# Patient Record
Sex: FEMALE | Race: WHITE | Employment: UNEMPLOYED | URBAN - METROPOLITAN AREA
[De-identification: names, ages, dates, MRNs, and addresses within clinical notes are randomized per-mention and may not be internally consistent; named-entity substitution may affect disease eponyms.]

---

## 2018-05-16 ENCOUNTER — APPOINTMENT (OUTPATIENT)
Dept: GENERAL RADIOLOGY | Age: 33
End: 2018-05-16
Payer: COMMERCIAL

## 2018-05-16 ENCOUNTER — HOSPITAL ENCOUNTER (EMERGENCY)
Age: 33
Discharge: HOME | End: 2018-05-17
Attending: EMERGENCY MEDICINE
Payer: COMMERCIAL

## 2018-05-16 VITALS
OXYGEN SATURATION: 98 % | SYSTOLIC BLOOD PRESSURE: 143 MMHG | WEIGHT: 165 LBS | HEIGHT: 62 IN | BODY MASS INDEX: 30.36 KG/M2 | HEART RATE: 88 BPM | RESPIRATION RATE: 20 BRPM | TEMPERATURE: 97.2 F | DIASTOLIC BLOOD PRESSURE: 101 MMHG

## 2018-05-16 DIAGNOSIS — S52.611A CLOSED DISPLACED FRACTURE OF STYLOID PROCESS OF RIGHT ULNA, INITIAL ENCOUNTER: ICD-10-CM
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
## 2018-05-16 DIAGNOSIS — S52.501A CLOSED FRACTURE OF DISTAL END OF RIGHT RADIUS, UNSPECIFIED FRACTURE MORPHOLOGY, INITIAL ENCOUNTER: Primary | ICD-10-CM

## 2018-05-16 PROCEDURE — 25605 CLTX DST RDL FX/EPHYS SEP W/: CPT

## 2018-05-16 PROCEDURE — 73130 X-RAY EXAM OF HAND: CPT

## 2018-05-16 PROCEDURE — 96374 THER/PROPH/DIAG INJ IV PUSH: CPT

## 2018-05-16 PROCEDURE — 73110 X-RAY EXAM OF WRIST: CPT

## 2018-05-16 PROCEDURE — 73080 X-RAY EXAM OF ELBOW: CPT

## 2018-05-16 PROCEDURE — 6360000002 HC RX W HCPCS: Performed by: EMERGENCY MEDICINE

## 2018-05-16 PROCEDURE — 99283 EMERGENCY DEPT VISIT LOW MDM: CPT

## 2018-05-16 RX ORDER — FENTANYL CITRATE 50 UG/ML
100 INJECTION, SOLUTION INTRAMUSCULAR; INTRAVENOUS ONCE
Status: COMPLETED | OUTPATIENT
Start: 2018-05-16 | End: 2018-05-16

## 2018-05-16 RX ADMIN — FENTANYL CITRATE 100 MCG: 50 INJECTION, SOLUTION INTRAMUSCULAR; INTRAVENOUS at 23:41

## 2018-05-16 ASSESSMENT — PAIN DESCRIPTION - LOCATION: LOCATION: WRIST

## 2018-05-16 ASSESSMENT — PAIN SCALES - GENERAL: PAINLEVEL_OUTOF10: 10

## 2018-05-16 ASSESSMENT — PAIN DESCRIPTION - ORIENTATION: ORIENTATION: RIGHT

## 2018-05-16 ASSESSMENT — PAIN DESCRIPTION - PAIN TYPE: TYPE: ACUTE PAIN

## 2018-05-17 ENCOUNTER — APPOINTMENT (OUTPATIENT)
Dept: GENERAL RADIOLOGY | Age: 33
End: 2018-05-17
Payer: COMMERCIAL

## 2018-05-17 PROCEDURE — 96376 TX/PRO/DX INJ SAME DRUG ADON: CPT

## 2018-05-17 PROCEDURE — 6360000002 HC RX W HCPCS: Performed by: EMERGENCY MEDICINE

## 2018-05-17 PROCEDURE — 2500000003 HC RX 250 WO HCPCS: Performed by: STUDENT IN AN ORGANIZED HEALTH CARE EDUCATION/TRAINING PROGRAM

## 2018-05-17 PROCEDURE — 73090 X-RAY EXAM OF FOREARM: CPT

## 2018-05-17 PROCEDURE — 73100 X-RAY EXAM OF WRIST: CPT

## 2018-05-17 PROCEDURE — 73110 X-RAY EXAM OF WRIST: CPT

## 2018-05-17 RX ORDER — LIDOCAINE HYDROCHLORIDE 10 MG/ML
20 INJECTION, SOLUTION INFILTRATION; PERINEURAL ONCE
Status: COMPLETED | OUTPATIENT
Start: 2018-05-17 | End: 2018-05-17

## 2018-05-17 RX ORDER — FENTANYL CITRATE 50 UG/ML
100 INJECTION, SOLUTION INTRAMUSCULAR; INTRAVENOUS ONCE
Status: DISCONTINUED | OUTPATIENT
Start: 2018-05-17 | End: 2018-05-17 | Stop reason: HOSPADM

## 2018-05-17 RX ORDER — HYDROCODONE BITARTRATE AND ACETAMINOPHEN 5; 325 MG/1; MG/1
1 TABLET ORAL EVERY 6 HOURS PRN
Qty: 12 TABLET | Refills: 0 | Status: SHIPPED | OUTPATIENT
Start: 2018-05-17 | End: 2018-05-20

## 2018-05-17 RX ORDER — FENTANYL CITRATE 50 UG/ML
100 INJECTION, SOLUTION INTRAMUSCULAR; INTRAVENOUS ONCE
Status: COMPLETED | OUTPATIENT
Start: 2018-05-17 | End: 2018-05-17

## 2018-05-17 RX ADMIN — FENTANYL CITRATE 100 MCG: 50 INJECTION, SOLUTION INTRAMUSCULAR; INTRAVENOUS at 00:38

## 2018-05-17 RX ADMIN — FENTANYL CITRATE 100 MCG: 50 INJECTION, SOLUTION INTRAMUSCULAR; INTRAVENOUS at 01:44

## 2018-05-17 RX ADMIN — LIDOCAINE HYDROCHLORIDE 20 ML: 10 INJECTION, SOLUTION INFILTRATION; PERINEURAL at 01:51

## 2018-05-17 ASSESSMENT — ENCOUNTER SYMPTOMS
SORE THROAT: 0
RHINORRHEA: 0
EYE REDNESS: 0
DIARRHEA: 0
SHORTNESS OF BREATH: 0
BLOOD IN STOOL: 0
ABDOMINAL PAIN: 0
WHEEZING: 0
ABDOMINAL DISTENTION: 0
STRIDOR: 0
COUGH: 0
CHEST TIGHTNESS: 0
VOMITING: 0
NAUSEA: 0
EYE DISCHARGE: 0

## 2018-05-21 DIAGNOSIS — S69.91XD INJURY OF RIGHT WRIST, SUBSEQUENT ENCOUNTER: Primary | ICD-10-CM

## 2018-05-22 DIAGNOSIS — S62.101A CLOSED FRACTURE OF RIGHT WRIST, INITIAL ENCOUNTER: Primary | ICD-10-CM

## 2018-05-24 ENCOUNTER — OFFICE VISIT (OUTPATIENT)
Dept: ORTHOPEDIC SURGERY | Age: 33
End: 2018-05-24
Payer: COMMERCIAL

## 2018-05-24 VITALS — HEIGHT: 62 IN | WEIGHT: 170 LBS | BODY MASS INDEX: 31.28 KG/M2

## 2018-05-24 DIAGNOSIS — S52.591A OTHER CLOSED FRACTURE OF DISTAL END OF RIGHT RADIUS, INITIAL ENCOUNTER: Primary | ICD-10-CM

## 2018-05-24 PROCEDURE — 25600 CLTX DST RDL FX/EPHYS SEP WO: CPT | Performed by: ORTHOPAEDIC SURGERY

## 2018-05-24 PROCEDURE — 99213 OFFICE O/P EST LOW 20 MIN: CPT | Performed by: ORTHOPAEDIC SURGERY

## 2018-05-24 RX ORDER — IBUPROFEN 800 MG/1
800 TABLET ORAL EVERY 6 HOURS PRN
COMMUNITY
End: 2021-08-08

## 2018-05-24 RX ORDER — OXYCODONE HYDROCHLORIDE AND ACETAMINOPHEN 5; 325 MG/1; MG/1
TABLET ORAL
Refills: 0 | COMMUNITY
Start: 2018-05-18 | End: 2021-08-08

## 2018-05-24 RX ORDER — TRAMADOL HYDROCHLORIDE 50 MG/1
50 TABLET ORAL EVERY 4 HOURS PRN
Qty: 30 TABLET | Refills: 0 | Status: SHIPPED | OUTPATIENT
Start: 2018-05-24 | End: 2018-05-29

## 2018-06-07 ENCOUNTER — OFFICE VISIT (OUTPATIENT)
Dept: ORTHOPEDIC SURGERY | Age: 33
End: 2018-06-07
Payer: COMMERCIAL

## 2018-06-07 VITALS
HEART RATE: 79 BPM | HEIGHT: 62 IN | WEIGHT: 160 LBS | BODY MASS INDEX: 29.44 KG/M2 | DIASTOLIC BLOOD PRESSURE: 79 MMHG | SYSTOLIC BLOOD PRESSURE: 121 MMHG

## 2018-06-07 DIAGNOSIS — S52.591D OTHER CLOSED FRACTURE OF DISTAL END OF RIGHT RADIUS WITH ROUTINE HEALING, SUBSEQUENT ENCOUNTER: ICD-10-CM

## 2018-06-07 PROBLEM — S52.501D CLOSED FRACTURE OF LOWER END OF RIGHT RADIUS WITH ROUTINE HEALING: Status: ACTIVE | Noted: 2018-06-07

## 2018-06-07 PROCEDURE — 99024 POSTOP FOLLOW-UP VISIT: CPT | Performed by: ORTHOPAEDIC SURGERY

## 2018-06-07 PROCEDURE — 29075 APPL CST ELBW FNGR SHORT ARM: CPT | Performed by: ORTHOPAEDIC SURGERY

## 2018-06-07 RX ORDER — ALPRAZOLAM 0.5 MG/1
TABLET ORAL
Refills: 0 | COMMUNITY
Start: 2018-05-29

## 2018-06-29 ENCOUNTER — TELEPHONE (OUTPATIENT)
Dept: ORTHOPEDIC SURGERY | Age: 33
End: 2018-06-29

## 2021-08-08 ENCOUNTER — APPOINTMENT (OUTPATIENT)
Dept: GENERAL RADIOLOGY | Age: 36
End: 2021-08-08
Payer: COMMERCIAL

## 2021-08-08 ENCOUNTER — HOSPITAL ENCOUNTER (EMERGENCY)
Age: 36
Discharge: HOME | End: 2021-08-08
Attending: EMERGENCY MEDICINE
Payer: COMMERCIAL

## 2021-08-08 VITALS
SYSTOLIC BLOOD PRESSURE: 105 MMHG | OXYGEN SATURATION: 100 % | WEIGHT: 135 LBS | RESPIRATION RATE: 16 BRPM | BODY MASS INDEX: 24.84 KG/M2 | DIASTOLIC BLOOD PRESSURE: 77 MMHG | TEMPERATURE: 97.9 F | HEART RATE: 62 BPM | HEIGHT: 62 IN

## 2021-08-08 DIAGNOSIS — L01.03 BULLOUS IMPETIGO: ICD-10-CM

## 2021-08-08 DIAGNOSIS — K02.9 DENTAL CARIES: Primary | ICD-10-CM

## 2021-08-08 DIAGNOSIS — S69.91XA: ICD-10-CM

## 2021-08-08 DIAGNOSIS — L02.91 ABSCESS: ICD-10-CM

## 2021-08-08 PROCEDURE — 6370000000 HC RX 637 (ALT 250 FOR IP): Performed by: STUDENT IN AN ORGANIZED HEALTH CARE EDUCATION/TRAINING PROGRAM

## 2021-08-08 PROCEDURE — 73110 X-RAY EXAM OF WRIST: CPT

## 2021-08-08 PROCEDURE — 99283 EMERGENCY DEPT VISIT LOW MDM: CPT

## 2021-08-08 PROCEDURE — 10061 I&D ABSCESS COMP/MULTIPLE: CPT

## 2021-08-08 RX ORDER — ACETAMINOPHEN 325 MG/1
650 TABLET ORAL ONCE
Status: COMPLETED | OUTPATIENT
Start: 2021-08-08 | End: 2021-08-08

## 2021-08-08 RX ORDER — NAPROXEN 500 MG/1
500 TABLET ORAL 2 TIMES DAILY WITH MEALS
Qty: 90 TABLET | Refills: 1 | Status: ON HOLD | OUTPATIENT
Start: 2021-08-08 | End: 2022-04-04 | Stop reason: HOSPADM

## 2021-08-08 RX ORDER — CEPHALEXIN 500 MG/1
500 CAPSULE ORAL 2 TIMES DAILY
Qty: 14 CAPSULE | Refills: 0 | Status: SHIPPED | OUTPATIENT
Start: 2021-08-08 | End: 2021-08-15

## 2021-08-08 RX ORDER — SULFAMETHOXAZOLE AND TRIMETHOPRIM 800; 160 MG/1; MG/1
1 TABLET ORAL 2 TIMES DAILY
Qty: 14 TABLET | Refills: 0 | Status: SHIPPED | OUTPATIENT
Start: 2021-08-08 | End: 2021-08-15

## 2021-08-08 RX ORDER — ACETAMINOPHEN 500 MG
500 TABLET ORAL 4 TIMES DAILY PRN
Qty: 120 TABLET | Refills: 0 | Status: ON HOLD | OUTPATIENT
Start: 2021-08-08 | End: 2022-04-04 | Stop reason: HOSPADM

## 2021-08-08 RX ADMIN — ACETAMINOPHEN 650 MG: 325 TABLET ORAL at 11:34

## 2021-08-08 ASSESSMENT — ENCOUNTER SYMPTOMS
FACIAL SWELLING: 0
EYE DISCHARGE: 0
ROS SKIN COMMENTS: BLISTERS
TROUBLE SWALLOWING: 0
SORE THROAT: 0
SHORTNESS OF BREATH: 0
ABDOMINAL PAIN: 0

## 2021-08-08 ASSESSMENT — PAIN SCALES - GENERAL
PAINLEVEL_OUTOF10: 8
PAINLEVEL_OUTOF10: 8

## 2021-08-08 ASSESSMENT — PAIN DESCRIPTION - ONSET: ONSET: ON-GOING

## 2021-08-08 ASSESSMENT — PAIN DESCRIPTION - LOCATION: LOCATION: TEETH

## 2021-08-08 ASSESSMENT — PAIN DESCRIPTION - ORIENTATION: ORIENTATION: RIGHT;LEFT;UPPER

## 2021-08-08 ASSESSMENT — PAIN DESCRIPTION - FREQUENCY: FREQUENCY: CONTINUOUS

## 2021-08-08 ASSESSMENT — PAIN DESCRIPTION - PAIN TYPE: TYPE: ACUTE PAIN

## 2021-08-08 ASSESSMENT — PAIN DESCRIPTION - DESCRIPTORS: DESCRIPTORS: ACHING

## 2021-08-08 NOTE — ED PROVIDER NOTES
[facility] ED  Emergency Department Encounter  EmergencyMedicine Resident     Pt Adrian Wright  MRN: [id number]  [place] 1985  Date of evaluation: 8/8/21  PCP:  Champ Lamar MD    This patient was evaluated in the Emergency Department for symptoms described in the history of present illness. The patient was evaluated in the context of the global COVID-19 pandemic, which necessitated consideration that the patient might be at risk for infection with the SARS-CoV-2 virus that causes COVID-19. Institutional protocols and algorithms that pertain to the evaluation of patients at risk for COVID-19 are in a state of rapid change based on information released by regulatory bodies including the CDC and federal and state organizations. These policies and algorithms were followed during the patient's care in the ED. CHIEF COMPLAINT       Chief Complaint   Patient presents with    Dental Pain     pain to top gums and some to lower gums, missing teeth pain for several days    Blister     showing up on hands intermittently     Wrist Injury     4 days ago hit right wrist in car, swelling. HISTORY OF PRESENT ILLNESS  (Location/Symptom, Timing/Onset, Context/Setting, Quality, Duration, Modifying Factors, Severity.)      Yuan Chávez is a 39 y.o. female who presents with acute on chronic dental pain, blistering on her hand, swelling of her right wrist, several swollen/painful masses on bilateral arms. 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 Their plan is to eventually remove her top row of teeth and get a full denture with a partial denture below.  She was told that she has weak enamel and is why she has multiple fractured and infected teeth in the past. Today she is coming in with concerns of a new infection, and has been unable to get appointment with her dentist- they told her the next appointment available is the end of August. She has noticed severe pain in the upper left tooth and tooth root #8 where she previously had a capped tooth that fractured off last summer. She has been taking ibuprofen which upsets her stomach and causes her to vomit. She injured her right wrist four days ago putting on car seat covers and bumped her wrist on the car frame. She noticed severe pain and swelling, which has improved over the past four days with an Ace wrap. She initially had decreased range of motion 2/2 pain, but since removal of ace wrap it has improved. She has history of fracture in that wrist and reports pain is not as significant. Over the past few weeks she has noticed several blisters. A few were noticed on her bilateral fingers and one in her left antecubital fossa. They eventually pop and have been healing well, today she noticed a new one at the base of her middle finger. PAST MEDICAL / SURGICAL / SOCIAL / FAMILY HISTORY      has a past medical history of Anxiety and Depression. has a past surgical history that includes LEEP. Had a previous abscess drained which required a drain be placed and removed at a later date.     Social History     Socioeconomic History    Marital status: Single     Spouse name: Not on file    Number of children: Not on file    Years of education: Not on file    Highest education level: Not on file   Occupational History    Not on file   Tobacco Use    Smoking status: Current Every Day Smoker    Smokeless tobacco: Never Used   Vaping Use    Vaping Use: Never used   Substance and Sexual Activity    Alcohol use: No    Drug use: Yes     Types: Marijuana    Sexual activity: Not on file   Other Topics Concern    Not on file   Social History Narrative    Not on file     Social Determinants of Health     Financial Resource Strain:     Difficulty of Paying Living Expenses:    Food Insecurity:     Worried About Running Out of Food in the Last Year:     [address] in the Last Capillary Refill: Capillary refill takes less than 2 seconds. Neurological:      General: No focal deficit present. Mental Status: She is alert. DIFFERENTIAL  DIAGNOSIS     PLAN (LABS / IMAGING / EKG):  Orders Placed This Encounter   Procedures    XR WRIST RIGHT (MIN 3 VIEWS)    [facility] [facility] Wrist Brace, Right       MEDICATIONS ORDERED:  Orders Placed This Encounter   Medications    acetaminophen (TYLENOL) tablet 650 mg    cephALEXin (KEFLEX) 500 MG capsule     Sig: Take 1 capsule by mouth 2 times daily for 7 days     Dispense:  14 capsule     Refill:  0    sulfamethoxazole-trimethoprim (BACTRIM DS;SEPTRA DS) 800-160 MG per tablet     Sig: Take 1 tablet by mouth 2 times daily for 7 days     Dispense:  14 tablet     Refill:  0    acetaminophen (TYLENOL) 500 MG tablet     Sig: Take 1 tablet by mouth 4 times daily as needed for Pain     Dispense:  120 tablet     Refill:  0    naproxen (NAPROSYN) 500 MG tablet     Sig: Take 1 tablet by mouth 2 times daily (with meals)     Dispense:  90 tablet     Refill:  1       DDX: dental caries, pulpitis  Arm abscess, cellulitis  Wrist fracture vs ligamentous injury    DIAGNOSTIC RESULTS / EMERGENCY DEPARTMENT COURSE / MDM     RADIOLOGY:  1.  Old healed distal radial fracture.       2.  Non healed old ulnar styloid process fracture.       3.  Widened radial lunate space with dorsal subluxation distal ulna   suggesting radioulnar ligamentous disruption with soft tissue swelling.       RECOMMENDATION:   MRI imaging of the wrist may prove helpful which can be performed on a   nonemergent basis. EMERGENCY DEPARTMENT COURSE:  Patient presenting for multiple concerns including dental pain, arm pain, right wrist pain, and hand blisters. She has noticed severe pain in the upper left tooth and tooth root #8 where she previously had a capped tooth that fractured off last summer.  She has been taking ibuprofen which upsets her stomach and causes her to vomit. She injured her right wrist four days ago putting on car seat covers and bumped her wrist on the car frame. She noticed severe pain and swelling, which has improved over the past four days with an Ace wrap. She initially had decreased range of motion 2/2 pain, but since removal of ace wrap it has improved. She has history of fracture in that wrist and reports pain is not as significant. Over the past few weeks she has noticed several blisters. A few were noticed on her bilateral fingers and one in her left antecubital fossa. They eventually pop and have been healing well, today she noticed a new one at the base of her middle finger. Patient denies IV drug use. Dental examination significant for several missing teeth, dark appearance of molar #15, tooth #8 absent/root present and dark appearing. Multiple dental caries noted on upper and lower teeth. Ultrasound examination consistent with abscess on left arm, cellulitis on right arm. Hand blister consistent with bullous impetigo. With decreased ROM in right wrist, concern for fracture so an X-ray was obtained which did not demonstrate fracture but possible ligamentous injury. Dental pain to be treated with antibiotics. Arm abscess incised and drained as below. Arm cellulitis and finger blister to be treated with antibiotics. Wrist injury splinted, instructed to follow up with PCP outpatient for various complaints and MRI. Instructed to follow up with dentist.    PROCEDURES:  PROCEDURE NOTE - INCISION and DRAINAGE    PATIENT NAME: [address] [place] [address] RECORD NO. [id number]  DATE: 8/8/2021  ATTENDING PHYSICIAN: Dr. Cj Bocanegra DIAGNOSIS:  Abscess  POSTOPERATIVE DIAGNOSIS:  Same  PROCEDURE PERFORMED:   Incision and drainage  PERFORMING PHYSICIAN: Erin De La Vega DO      DISCUSSION:  Geovanna Walsh is a 39y.o.-year-old female who requires an incision and drainage of a Abscess. The history and physical examination were reviewed and confirmed. CONSENT: The patient provided verbal consent for this procedure. PROCEDURE:  The patient was positioned appropriately and the skin over the incision site was prepped with chlorhexidine. Local anesthesia was obtained by infiltration using 1% Lidocaine with epinephrine. An incision was then made over the greatest area of fluctuance and approximately 5 cc of purulent material was expressed. Loculations were broken up using a hemostat and more of the material was able to be expressed. The drainage cavity was then irrigated. Covered with gauze. The patient tolerated the procedure well. COMPLICATIONS:  None     Paz Headley DO  12:54 PM, 8/8/21    FINAL IMPRESSION      1. Dental caries    2. Abscess    3. Bullous impetigo    4.  Injury of collateral ligament of right wrist, initial encounter          DISPOSITION / PLAN     DISPOSITION Decision To Discharge 08/08/2021 11:58:45 AM      PATIENT REFERRED TO:  Anna Martínez MD  [address] [place], [place] [address]   [phone]    Schedule an appointment as soon as possible for a visit         DISCHARGE MEDICATIONS:  Discharge Medication List as of 8/8/2021 12:03 PM      START taking these medications    Details   cephALEXin (KEFLEX) 500 MG capsule Take 1 capsule by mouth 2 times daily for 7 days, Disp-14 capsule, R-0Print      sulfamethoxazole-trimethoprim (BACTRIM DS;SEPTRA DS) 800-160 MG per tablet Take 1 tablet by mouth 2 times daily for 7 days, Disp-14 tablet, R-0Print      acetaminophen (TYLENOL) 500 MG tablet Take 1 tablet by mouth 4 times daily as needed for Pain, Disp-120 tablet, R-0Print             Paz Headley DO  Emergency Medicine Resident    (Please note that portions of thisnote were completed with a voice recognition program.  Efforts were made to edit the dictations but occasionally words are mis-transcribed.)       Ravi Victor DO  Resident  08/08/21 1697

## 2022-04-03 ENCOUNTER — APPOINTMENT (OUTPATIENT)
Dept: GENERAL RADIOLOGY | Age: 37
DRG: 313 | End: 2022-04-03
Payer: COMMERCIAL

## 2022-04-03 ENCOUNTER — HOSPITAL ENCOUNTER (EMERGENCY)
Age: 37
Discharge: LEFT BEFORE DISCHARGE | DRG: 313 | End: 2022-04-03
Attending: EMERGENCY MEDICINE
Payer: COMMERCIAL

## 2022-04-03 VITALS
DIASTOLIC BLOOD PRESSURE: 89 MMHG | TEMPERATURE: 99.5 F | HEART RATE: 89 BPM | WEIGHT: 128 LBS | BODY MASS INDEX: 23.55 KG/M2 | RESPIRATION RATE: 16 BRPM | OXYGEN SATURATION: 97 % | HEIGHT: 62 IN | SYSTOLIC BLOOD PRESSURE: 119 MMHG

## 2022-04-03 DIAGNOSIS — S82.301A CLOSED FRACTURE OF DISTAL END OF RIGHT TIBIA, UNSPECIFIED FRACTURE MORPHOLOGY, INITIAL ENCOUNTER: Primary | ICD-10-CM

## 2022-04-03 LAB
ABSOLUTE EOS #: 0.07 K/UL (ref 0–0.44)
ABSOLUTE IMMATURE GRANULOCYTE: 0 K/UL (ref 0–0.3)
ABSOLUTE LYMPH #: 2.35 K/UL (ref 1.1–3.7)
ABSOLUTE MONO #: 0.4 K/UL (ref 0.1–1.2)
ANION GAP SERPL CALCULATED.3IONS-SCNC: 7 MMOL/L (ref 9–17)
BASOPHILS # BLD: 0 % (ref 0–2)
BASOPHILS ABSOLUTE: <0.03 K/UL (ref 0–0.2)
BUN BLDV-MCNC: 9 MG/DL (ref 6–20)
BUN/CREAT BLD: 15 (ref 9–20)
CALCIUM SERPL-MCNC: 9.1 MG/DL (ref 8.6–10.4)
CHLORIDE BLD-SCNC: 102 MMOL/L (ref 98–107)
CO2: 28 MMOL/L (ref 20–31)
CREAT SERPL-MCNC: 0.6 MG/DL (ref 0.5–0.9)
EOSINOPHILS RELATIVE PERCENT: 1 % (ref 1–4)
GFR AFRICAN AMERICAN: >60 ML/MIN
GFR NON-AFRICAN AMERICAN: >60 ML/MIN
GFR SERPL CREATININE-BSD FRML MDRD: ABNORMAL ML/MIN/{1.73_M2}
GLUCOSE BLD-MCNC: 118 MG/DL (ref 70–99)
HCG QUALITATIVE: NEGATIVE
HCT VFR BLD CALC: 38.2 % (ref 36.3–47.1)
HEMOGLOBIN: 11.6 G/DL (ref 11.9–15.1)
IMMATURE GRANULOCYTES: 0 %
INR BLD: 0.9
LYMPHOCYTES # BLD: 40 % (ref 24–43)
MCH RBC QN AUTO: 26.9 PG (ref 25.2–33.5)
MCHC RBC AUTO-ENTMCNC: 30.4 G/DL (ref 28.4–34.8)
MCV RBC AUTO: 88.4 FL (ref 82.6–102.9)
MONOCYTES # BLD: 7 % (ref 3–12)
NRBC AUTOMATED: 0 PER 100 WBC
PARTIAL THROMBOPLASTIN TIME: 31.4 SEC (ref 23.9–33.8)
PDW BLD-RTO: 15.3 % (ref 11.8–14.4)
PLATELET # BLD: 370 K/UL (ref 138–453)
PMV BLD AUTO: 9.5 FL (ref 8.1–13.5)
POTASSIUM SERPL-SCNC: 4.4 MMOL/L (ref 3.7–5.3)
PROTHROMBIN TIME: 12.1 SEC (ref 11.5–14.2)
RBC # BLD: 4.32 M/UL (ref 3.95–5.11)
RBC # BLD: ABNORMAL 10*6/UL
SEG NEUTROPHILS: 52 % (ref 36–65)
SEGMENTED NEUTROPHILS ABSOLUTE COUNT: 2.98 K/UL (ref 1.5–8.1)
SODIUM BLD-SCNC: 137 MMOL/L (ref 135–144)
WBC # BLD: 5.8 K/UL (ref 3.5–11.3)

## 2022-04-03 PROCEDURE — 73610 X-RAY EXAM OF ANKLE: CPT

## 2022-04-03 PROCEDURE — 2580000003 HC RX 258: Performed by: NURSE PRACTITIONER

## 2022-04-03 PROCEDURE — 96376 TX/PRO/DX INJ SAME DRUG ADON: CPT

## 2022-04-03 PROCEDURE — 85025 COMPLETE CBC W/AUTO DIFF WBC: CPT

## 2022-04-03 PROCEDURE — 73590 X-RAY EXAM OF LOWER LEG: CPT

## 2022-04-03 PROCEDURE — 84703 CHORIONIC GONADOTROPIN ASSAY: CPT

## 2022-04-03 PROCEDURE — 99284 EMERGENCY DEPT VISIT MOD MDM: CPT

## 2022-04-03 PROCEDURE — 96375 TX/PRO/DX INJ NEW DRUG ADDON: CPT

## 2022-04-03 PROCEDURE — 29505 APPLICATION LONG LEG SPLINT: CPT

## 2022-04-03 PROCEDURE — 85610 PROTHROMBIN TIME: CPT

## 2022-04-03 PROCEDURE — 85730 THROMBOPLASTIN TIME PARTIAL: CPT

## 2022-04-03 PROCEDURE — 6360000002 HC RX W HCPCS: Performed by: NURSE PRACTITIONER

## 2022-04-03 PROCEDURE — 96374 THER/PROPH/DIAG INJ IV PUSH: CPT

## 2022-04-03 PROCEDURE — 80048 BASIC METABOLIC PNL TOTAL CA: CPT

## 2022-04-03 RX ORDER — SODIUM CHLORIDE 9 MG/ML
INJECTION, SOLUTION INTRAVENOUS CONTINUOUS
Status: DISCONTINUED | OUTPATIENT
Start: 2022-04-03 | End: 2022-04-03 | Stop reason: HOSPADM

## 2022-04-03 RX ORDER — MORPHINE SULFATE 4 MG/ML
4 INJECTION, SOLUTION INTRAMUSCULAR; INTRAVENOUS ONCE
Status: COMPLETED | OUTPATIENT
Start: 2022-04-03 | End: 2022-04-03

## 2022-04-03 RX ORDER — OXYCODONE HYDROCHLORIDE AND ACETAMINOPHEN 5; 325 MG/1; MG/1
1 TABLET ORAL EVERY 6 HOURS PRN
Qty: 10 TABLET | Refills: 0 | Status: ON HOLD | OUTPATIENT
Start: 2022-04-03 | End: 2022-04-04 | Stop reason: HOSPADM

## 2022-04-03 RX ORDER — 0.9 % SODIUM CHLORIDE 0.9 %
1000 INTRAVENOUS SOLUTION INTRAVENOUS ONCE
Status: COMPLETED | OUTPATIENT
Start: 2022-04-03 | End: 2022-04-03

## 2022-04-03 RX ORDER — ONDANSETRON 2 MG/ML
4 INJECTION INTRAMUSCULAR; INTRAVENOUS ONCE
Status: COMPLETED | OUTPATIENT
Start: 2022-04-03 | End: 2022-04-03

## 2022-04-03 RX ADMIN — MORPHINE SULFATE 4 MG: 4 INJECTION, SOLUTION INTRAMUSCULAR; INTRAVENOUS at 18:43

## 2022-04-03 RX ADMIN — MORPHINE SULFATE 4 MG: 4 INJECTION, SOLUTION INTRAMUSCULAR; INTRAVENOUS at 17:08

## 2022-04-03 RX ADMIN — SODIUM CHLORIDE 1000 ML: 9 INJECTION, SOLUTION INTRAVENOUS at 17:11

## 2022-04-03 RX ADMIN — ONDANSETRON 4 MG: 2 INJECTION INTRAMUSCULAR; INTRAVENOUS at 17:09

## 2022-04-03 ASSESSMENT — ENCOUNTER SYMPTOMS: COLOR CHANGE: 1

## 2022-04-03 ASSESSMENT — PAIN SCALES - GENERAL
PAINLEVEL_OUTOF10: 10
PAINLEVEL_OUTOF10: 8

## 2022-04-03 NOTE — ED PROVIDER NOTES
This visit was performed by both a physician and an APC. I personally evaluated and examined the patient. I performed all aspects of the MDM as documented. She has displaced tibial fracture.   Findings were discussed with the patient and she adamantly refuses to stay in the hospital.     Marlin Saravia MD  04/03/22 [id number]

## 2022-04-03 NOTE — ED PROVIDER NOTES
[address] [place] [address] ED  EMERGENCY DEPARTMENT ENCOUNTER      Pt Name: Jessica Pradhan  MRN: [id number]  [place] 1985  Date of evaluation: 4/3/2022  Provider: SWEETIE Sullivan CNP    CHIEF COMPLAINT       Chief Complaint   Patient presents with    Leg Pain     right leg in shower. 1400; no meds pta         HISTORY OFPRESENT ILLNESS  (Location/Symptom, Timing/Onset, Context/Setting, Quality, Duration, Modifying Factors, Severity.)   Jessica Pradhan is a 39 y.o. female who presents to the emergency department by private auto for evaluation of right leg pain after she fell getting out of the shower around 2 PM today. Patient states she slipped in her organoid off on her feet causing her to fall. She is unsure if she may have struck her right leg on the toilet or bathtub. States she got up and lay down in the bed. The pain was so significant she cannot sleep. She is unable to bear weight on the leg after that. Called EMS. Pain is 10 out of 10. Denies other injury. Did Not hit her head. Nursing Notes were reviewed.     PASTMEDICAL HISTORY     Past Medical History:   Diagnosis Date    Anxiety     Depression          SURGICAL HISTORY       Past Surgical History:   Procedure Laterality Date    LEEP           CURRENT MEDICATIONS     Discharge Medication List as of 4/3/2022  6:58 PM      CONTINUE these medications which have NOT CHANGED    Details   acetaminophen (TYLENOL) 500 MG tablet Take 1 tablet by mouth 4 times daily as needed for Pain, Disp-120 tablet, R-0Print      naproxen (NAPROSYN) 500 MG tablet Take 1 tablet by mouth 2 times daily (with meals), Disp-90 tablet, R-1Print      ALPRAZolam (XANAX) 0.5 MG tablet TAKE 1 TABLET BY MOUTH TWICE A DAY, R-0Historical Med             ALLERGIES     No known allergies    FAMILY HISTORY       Family History   Problem Relation Age of Onset    Hypertension Mother     Stroke Mother     Thyroid Disease Mother     Diabetes Father     Heart Disease Father SOCIAL HISTORY       Social History     Socioeconomic History    Marital status: Single     Spouse name: None    Number of children: None    Years of education: None    Highest education level: None   Occupational History    None   Tobacco Use    Smoking status: Current Every Day Smoker    Smokeless tobacco: Never Used   Vaping Use    Vaping Use: Never used   Substance and Sexual Activity    Alcohol use: No    Drug use: Yes     Types: Marijuana [place])    Sexual activity: None   Other Topics Concern    None   Social History Narrative    None     Social Determinants of Health     Financial Resource Strain:     Difficulty of Paying Living Expenses: Not on file   Food Insecurity:     Worried About Running Out of Food in the Last Year: Not on file    Luke of Food in the Last Year: Not on file   Transportation Needs:     Lack of Transportation (Medical): Not on file    Lack of Transportation (Non-Medical):  Not on file   Physical Activity:     Days of Exercise per Week: Not on file    Minutes of Exercise per Session: Not on file   Stress:     Feeling of Stress : Not on file   Social Connections:     Frequency of Communication with Friends and Family: Not on file    Frequency of Social Gatherings with Friends and Family: Not on file    Attends Spiritism Services: Not on file    Active Member of [address] [place] [address] or Organizations: Not on file    Attends Club or Organization Meetings: Not on file    Marital Status: Not on file   Intimate Partner Violence:     Fear of Current or Ex-Partner: Not on file    Emotionally Abused: Not on file    Physically Abused: Not on file    Sexually Abused: Not on file   Housing Stability:     Unable to Pay for Housing in the Last Year: Not on file    Number of [place] in the Last Year: Not on file    Unstable Housing in the Last Year: Not on file         REVIEW OF SYSTEMS    (2-9 systems for level 4, 10 or more for level 5)     Review of Systems Constitutional: Positive for activity change. Musculoskeletal: Positive for arthralgias and gait problem. Skin: Positive for color change. Negative for wound. All other systems reviewed and are negative. Except as noted above the remainder of the review of systems was reviewed and negative. PHYSICAL EXAM    (up to 7 for level 4, 8 or more for level 5)     ED Triage Vitals [04/03/22 1632]   BP Temp Temp Source Pulse Resp SpO2 Height Weight   119/89 99.5 °F (37.5 °C) Oral 89 16 97 % 5' 2\" (1.575 m) 128 lb (58.1 kg)       Physical Exam  Constitutional:       General: She is in acute distress. Appearance: Normal appearance. She is well-developed, well-groomed and normal weight. HENT:      Head: Normocephalic. Right Ear: External ear normal.      Left Ear: External ear normal.      Nose: Nose normal.      Mouth/Throat:      Mouth: Mucous membranes are moist.   Eyes:      Extraocular Movements: Extraocular movements intact. Conjunctiva/sclera: Conjunctivae normal.      Pupils: Pupils are equal, round, and reactive to light. Cardiovascular:      Pulses:           Dorsalis pedis pulses are 2+ on the right side. Posterior tibial pulses are 2+ on the right side. Pulmonary:      Effort: Pulmonary effort is normal. No respiratory distress. Musculoskeletal:      Right lower leg: Swelling and tenderness present. Right ankle: Swelling present. Tenderness present over the lateral malleolus and medial malleolus. Decreased range of motion. Right foot: Normal.        Legs:       Comments: There is tenderness, ecchymosis, and swelling to the mid anterior portion of the right lower extremity. Patient says swelling and tenderness to the lateral and medial malleolus of the right ankle. Difficulty flexing her ankle. Pedal pulses palpable. Skin:     General: Skin is warm and dry. Capillary Refill: Capillary refill takes less than 2 seconds. Findings: Bruising present.  No Result   Tibial fracture. EDBEDSIDE ULTRASOUND:   Performed by Loreto Morales - none    LABS:  Labs Reviewed   CBC WITH AUTO DIFFERENTIAL - Abnormal; Notable for the following components:       Result Value    Hemoglobin 11.6 (*)     RDW 15.3 (*)     All other components within normal limits   BASIC METABOLIC PANEL - Abnormal; Notable for the following components:    Glucose 118 (*)     Anion Gap 7 (*)     All other components within normal limits   HCG, SERUM, QUALITATIVE   APTT   PROTIME-INR       All other labs were within normal range or not returned as of this dictation. EMERGENCY DEPARTMENT COURSE andDIFFERENTIAL DIAGNOSIS/MDM:   Patient evaluated in conjunction attending physician. X-ray shows displaced oblique fracture of the distal tibia without significant angulation. Component of the fracture extends distally to involve the medial malleolus with intra-articular extension soft tissue swelling is noted. Pedal pulses palpable. Right lower extremity is soft to palpation. Discussed x-ray results with patient and informed her she will be admitted for further evaluation. At first the patient agreed to be admitted. Orthopedic consult-I spoke with Dr. Ness Rios. The plan was to the patient overnight to have surgery in the morning. I went back to discuss the plan with the patient after she initially was agreeable to being admitted to hospital but then the patient states she did not want to stay overnight and she will come back tomorrow. I discussed the risks of patient going home. Risks of compartment syndrome and worsening of her symptoms. Patient states she still wants to leave. Patient is alert and oriented x3. She is competent in her medical decisions. Informed patient that she will need to sign out AMA which she did. I spoke with Dr. Ness Rios again in regards to patient not wanting to stay.   Dr. Ness Rios stated patient can come back tomorrow at 1 PM for surgery at 3 PM.  I discussed this with the patient and she is agreeable. Informed her that she will need to not eat or drink anything after midnight. Do not take any blood thinning medication. A long leg posterior splint was placed to the right lower extremity by the nurse. I checked application of splint found to be in appropriate position. Patient is neurovascularly intact post splint application. She was provided with crutches. Patient is to come back to the hospital tomorrow at 1 PM for surgery at 3 PM.  Strict return precautions were provided to the patient, should her symptoms worsen through the night. A prescription was written for Percocet. Vitals:    Vitals:    04/03/22 1632   BP: 119/89   Pulse: 89   Resp: 16   Temp: 99.5 °F (37.5 °C)   TempSrc: Oral   SpO2: 97%   Weight: 128 lb (58.1 kg)   Height: 5' 2\" (1.575 m)         CONSULTS:  IP CONSULT TO ORTHOPEDIC SURGERY  IP CONSULT TO INTERNAL MEDICINE    RES:  Procedures    FINAL IMPRESSION      1. Closed fracture of distal end of right tibia, unspecified fracture morphology, initial encounter          DISPOSITION/PLAN   DISPOSITION [place] 04/03/2022 06:53:47 PM      PATIENT REFERRED TO:     Come back to the hospital at 1 PM tomorrow. [facility] ED  [address] [facility]  [phone]    If symptoms worsen      DISCHARGE MEDICATIONS:     Discharge Medication List as of 4/3/2022  6:58 PM      START taking these medications    Details   oxyCODONE-acetaminophen (PERCOCET) 5-325 MG per tablet Take 1 tablet by mouth every 6 hours as needed for Pain for up to 3 days. Intended supply: 3 days.  Take lowest dose possible to manage pain, Disp-10 tablet, R-0Print           Electronically signed by SWEETIE Paige 4/4/2022 at [address] SWEETIE [address] CNP  04/04/22 2024

## 2022-04-03 NOTE — Clinical Note
The patient has decided to leave against medical advice, because she states she needs to go home and take care of her animals. She has normal mental status and adequate capacity to make medical decisions. The patient refuses hospital admission  and wants to be discharged. The risks have been explained to the patient, including worsening illness, chronic pain, permanent disability, and death. Symptoms of compartment syndrome discussed with the patient. The benefits of admission have a lso been explained, including the availability and proximity of nurses, physicians, monitoring, diagnostic testing, and treatment. The patient was able to understand and state the risks and benefits of hospital admission. This was witnessed by me . She had the opportunity to ask questions about her medical condition. The patient was treated to the extent that she would allow, and knows that she may return for care at any time. Follow up has been discussed and arranged with Dr. Karla Casillas. Patient is to come back to the hospital at 1 PM tomorrow to have surgery on her leg at 3 PM.  This was discussed with the patient and she agrees.

## 2022-04-04 ENCOUNTER — HOSPITAL ENCOUNTER (INPATIENT)
Age: 37
LOS: 1 days | Discharge: HOME | DRG: 313 | End: 2022-04-05
Attending: ORTHOPAEDIC SURGERY | Admitting: ORTHOPAEDIC SURGERY
Payer: COMMERCIAL

## 2022-04-04 ENCOUNTER — APPOINTMENT (OUTPATIENT)
Dept: GENERAL RADIOLOGY | Age: 37
DRG: 313 | End: 2022-04-04
Attending: ORTHOPAEDIC SURGERY
Payer: COMMERCIAL

## 2022-04-04 ENCOUNTER — ANESTHESIA (OUTPATIENT)
Dept: OPERATING ROOM | Age: 37
DRG: 313 | End: 2022-04-04
Payer: COMMERCIAL

## 2022-04-04 ENCOUNTER — ANESTHESIA EVENT (OUTPATIENT)
Dept: OPERATING ROOM | Age: 37
DRG: 313 | End: 2022-04-04
Payer: COMMERCIAL

## 2022-04-04 VITALS — DIASTOLIC BLOOD PRESSURE: 68 MMHG | OXYGEN SATURATION: 97 % | SYSTOLIC BLOOD PRESSURE: 137 MMHG | TEMPERATURE: 99.3 F

## 2022-04-04 DIAGNOSIS — G89.18 POST-OPERATIVE PAIN: Primary | ICD-10-CM

## 2022-04-04 PROBLEM — S82.241A CLOSED DISPLACED SPIRAL FRACTURE OF SHAFT OF RIGHT TIBIA: Status: ACTIVE | Noted: 2022-04-04

## 2022-04-04 LAB
HCT VFR BLD CALC: 37 % (ref 36.3–47.1)
HEMOGLOBIN: 10.9 G/DL (ref 11.9–15.1)
SARS-COV-2, RAPID: NOT DETECTED
SPECIMEN DESCRIPTION: NORMAL

## 2022-04-04 PROCEDURE — 6360000002 HC RX W HCPCS: Performed by: ANESTHESIOLOGY

## 2022-04-04 PROCEDURE — 2500000003 HC RX 250 WO HCPCS: Performed by: NURSE ANESTHETIST, CERTIFIED REGISTERED

## 2022-04-04 PROCEDURE — 7100000001 HC PACU RECOVERY - ADDTL 15 MIN: Performed by: ORTHOPAEDIC SURGERY

## 2022-04-04 PROCEDURE — 99223 1ST HOSP IP/OBS HIGH 75: CPT | Performed by: ORTHOPAEDIC SURGERY

## 2022-04-04 PROCEDURE — C1769 GUIDE WIRE: HCPCS | Performed by: ORTHOPAEDIC SURGERY

## 2022-04-04 PROCEDURE — 27759 TREATMENT OF TIBIA FRACTURE: CPT | Performed by: ORTHOPAEDIC SURGERY

## 2022-04-04 PROCEDURE — 2580000003 HC RX 258: Performed by: NURSE ANESTHETIST, CERTIFIED REGISTERED

## 2022-04-04 PROCEDURE — 64447 NJX AA&/STRD FEMORAL NRV IMG: CPT | Performed by: ANESTHESIOLOGY

## 2022-04-04 PROCEDURE — 85014 HEMATOCRIT: CPT

## 2022-04-04 PROCEDURE — 3700000001 HC ADD 15 MINUTES (ANESTHESIA): Performed by: ORTHOPAEDIC SURGERY

## 2022-04-04 PROCEDURE — 3700000000 HC ANESTHESIA ATTENDED CARE: Performed by: ORTHOPAEDIC SURGERY

## 2022-04-04 PROCEDURE — 73590 X-RAY EXAM OF LOWER LEG: CPT

## 2022-04-04 PROCEDURE — C1713 ANCHOR/SCREW BN/BN,TIS/BN: HCPCS | Performed by: ORTHOPAEDIC SURGERY

## 2022-04-04 PROCEDURE — 2720000010 HC SURG SUPPLY STERILE: Performed by: ORTHOPAEDIC SURGERY

## 2022-04-04 PROCEDURE — 27899 UNLISTED PX LEG/ANKLE: CPT | Performed by: ORTHOPAEDIC SURGERY

## 2022-04-04 PROCEDURE — 0QSG06Z REPOSITION RIGHT TIBIA WITH INTRAMEDULLARY INTERNAL FIXATION DEVICE, OPEN APPROACH: ICD-10-PCS | Performed by: ORTHOPAEDIC SURGERY

## 2022-04-04 PROCEDURE — 6370000000 HC RX 637 (ALT 250 FOR IP): Performed by: ORTHOPAEDIC SURGERY

## 2022-04-04 PROCEDURE — 3600000003 HC SURGERY LEVEL 3 BASE: Performed by: ORTHOPAEDIC SURGERY

## 2022-04-04 PROCEDURE — 36415 COLL VENOUS BLD VENIPUNCTURE: CPT

## 2022-04-04 PROCEDURE — G0378 HOSPITAL OBSERVATION PER HR: HCPCS

## 2022-04-04 PROCEDURE — 85018 HEMOGLOBIN: CPT

## 2022-04-04 PROCEDURE — 87635 SARS-COV-2 COVID-19 AMP PRB: CPT

## 2022-04-04 PROCEDURE — 6370000000 HC RX 637 (ALT 250 FOR IP): Performed by: ANESTHESIOLOGY

## 2022-04-04 PROCEDURE — 73610 X-RAY EXAM OF ANKLE: CPT

## 2022-04-04 PROCEDURE — 3209999900 FLUORO FOR SURGICAL PROCEDURES

## 2022-04-04 PROCEDURE — 6360000002 HC RX W HCPCS: Performed by: ORTHOPAEDIC SURGERY

## 2022-04-04 PROCEDURE — 2500000003 HC RX 250 WO HCPCS: Performed by: ANESTHESIOLOGY

## 2022-04-04 PROCEDURE — 7100000000 HC PACU RECOVERY - FIRST 15 MIN: Performed by: ORTHOPAEDIC SURGERY

## 2022-04-04 PROCEDURE — 1200000000 HC SEMI PRIVATE

## 2022-04-04 PROCEDURE — 6360000002 HC RX W HCPCS: Performed by: NURSE ANESTHETIST, CERTIFIED REGISTERED

## 2022-04-04 PROCEDURE — 6370000000 HC RX 637 (ALT 250 FOR IP): Performed by: STUDENT IN AN ORGANIZED HEALTH CARE EDUCATION/TRAINING PROGRAM

## 2022-04-04 PROCEDURE — 2709999900 HC NON-CHARGEABLE SUPPLY: Performed by: ORTHOPAEDIC SURGERY

## 2022-04-04 PROCEDURE — 3600000013 HC SURGERY LEVEL 3 ADDTL 15MIN: Performed by: ORTHOPAEDIC SURGERY

## 2022-04-04 DEVICE — SCREW BNE L30MM DIA4MM TIB BLU TI ST CANN LOK FULL THRD T25: Type: IMPLANTABLE DEVICE | Site: TIBIA | Status: FUNCTIONAL

## 2022-04-04 DEVICE — IMPLANTABLE DEVICE: Type: IMPLANTABLE DEVICE | Site: TIBIA | Status: FUNCTIONAL

## 2022-04-04 DEVICE — SCREW BNE L36MM DIA4MM TIB BLU TI ST CANN LOK FULL THRD T25: Type: IMPLANTABLE DEVICE | Site: TIBIA | Status: FUNCTIONAL

## 2022-04-04 DEVICE — SCREW BNE L32MM DIA4MM TIB BLU TI ST CANN LOK FULL THRD T25: Type: IMPLANTABLE DEVICE | Site: TIBIA | Status: FUNCTIONAL

## 2022-04-04 DEVICE — SCREW BNE L34MM DIA4MM NONSTERILE TIB BLU TI ST CANN LOK: Type: IMPLANTABLE DEVICE | Status: FUNCTIONAL

## 2022-04-04 RX ORDER — NEOSTIGMINE METHYLSULFATE 5 MG/5 ML
SYRINGE (ML) INTRAVENOUS PRN
Status: DISCONTINUED | OUTPATIENT
Start: 2022-04-04 | End: 2022-04-04 | Stop reason: SDUPTHER

## 2022-04-04 RX ORDER — ACETAMINOPHEN 500 MG
1000 TABLET ORAL EVERY 6 HOURS
Status: DISCONTINUED | OUTPATIENT
Start: 2022-04-04 | End: 2022-04-05 | Stop reason: HOSPADM

## 2022-04-04 RX ORDER — ROCURONIUM BROMIDE 10 MG/ML
INJECTION, SOLUTION INTRAVENOUS PRN
Status: DISCONTINUED | OUTPATIENT
Start: 2022-04-04 | End: 2022-04-04 | Stop reason: SDUPTHER

## 2022-04-04 RX ORDER — ONDANSETRON 2 MG/ML
4 INJECTION INTRAMUSCULAR; INTRAVENOUS EVERY 6 HOURS PRN
Status: DISCONTINUED | OUTPATIENT
Start: 2022-04-04 | End: 2022-04-05 | Stop reason: HOSPADM

## 2022-04-04 RX ORDER — FENTANYL CITRATE 50 UG/ML
25 INJECTION, SOLUTION INTRAMUSCULAR; INTRAVENOUS EVERY 5 MIN PRN
Status: DISCONTINUED | OUTPATIENT
Start: 2022-04-04 | End: 2022-04-04 | Stop reason: HOSPADM

## 2022-04-04 RX ORDER — PHENYLEPHRINE HCL IN 0.9% NACL 1 MG/10 ML
SYRINGE (ML) INTRAVENOUS PRN
Status: DISCONTINUED | OUTPATIENT
Start: 2022-04-04 | End: 2022-04-04 | Stop reason: SDUPTHER

## 2022-04-04 RX ORDER — SODIUM CHLORIDE 9 MG/ML
INJECTION, SOLUTION INTRAVENOUS PRN
Status: DISCONTINUED | OUTPATIENT
Start: 2022-04-04 | End: 2022-04-04 | Stop reason: HOSPADM

## 2022-04-04 RX ORDER — SODIUM CHLORIDE 0.9 % (FLUSH) 0.9 %
5-40 SYRINGE (ML) INJECTION EVERY 12 HOURS SCHEDULED
Status: DISCONTINUED | OUTPATIENT
Start: 2022-04-04 | End: 2022-04-04 | Stop reason: HOSPADM

## 2022-04-04 RX ORDER — OXYCODONE HYDROCHLORIDE 5 MG/1
5 TABLET ORAL EVERY 4 HOURS PRN
Status: DISCONTINUED | OUTPATIENT
Start: 2022-04-04 | End: 2022-04-05 | Stop reason: HOSPADM

## 2022-04-04 RX ORDER — SODIUM CHLORIDE, SODIUM LACTATE, POTASSIUM CHLORIDE, CALCIUM CHLORIDE 600; 310; 30; 20 MG/100ML; MG/100ML; MG/100ML; MG/100ML
INJECTION, SOLUTION INTRAVENOUS CONTINUOUS PRN
Status: DISCONTINUED | OUTPATIENT
Start: 2022-04-04 | End: 2022-04-04 | Stop reason: SDUPTHER

## 2022-04-04 RX ORDER — ONDANSETRON 4 MG/1
4 TABLET, ORALLY DISINTEGRATING ORAL EVERY 8 HOURS PRN
Status: DISCONTINUED | OUTPATIENT
Start: 2022-04-04 | End: 2022-04-05 | Stop reason: HOSPADM

## 2022-04-04 RX ORDER — SODIUM CHLORIDE 0.9 % (FLUSH) 0.9 %
5-40 SYRINGE (ML) INJECTION PRN
Status: DISCONTINUED | OUTPATIENT
Start: 2022-04-04 | End: 2022-04-04 | Stop reason: HOSPADM

## 2022-04-04 RX ORDER — SODIUM CHLORIDE 0.9 % (FLUSH) 0.9 %
5-40 SYRINGE (ML) INJECTION EVERY 12 HOURS SCHEDULED
Status: DISCONTINUED | OUTPATIENT
Start: 2022-04-04 | End: 2022-04-05 | Stop reason: HOSPADM

## 2022-04-04 RX ORDER — GABAPENTIN 100 MG/1
100 CAPSULE ORAL 3 TIMES DAILY
Qty: 42 CAPSULE | Refills: 0 | Status: SHIPPED | OUTPATIENT
Start: 2022-04-04 | End: 2022-04-22 | Stop reason: SDUPTHER

## 2022-04-04 RX ORDER — OXYCODONE HYDROCHLORIDE 5 MG/1
10 TABLET ORAL EVERY 4 HOURS PRN
Status: DISCONTINUED | OUTPATIENT
Start: 2022-04-04 | End: 2022-04-05 | Stop reason: HOSPADM

## 2022-04-04 RX ORDER — EPHEDRINE SULFATE/0.9% NACL/PF 50 MG/5 ML
SYRINGE (ML) INTRAVENOUS PRN
Status: DISCONTINUED | OUTPATIENT
Start: 2022-04-04 | End: 2022-04-04 | Stop reason: SDUPTHER

## 2022-04-04 RX ORDER — HYDROMORPHONE HYDROCHLORIDE 1 MG/ML
0.25 INJECTION, SOLUTION INTRAMUSCULAR; INTRAVENOUS; SUBCUTANEOUS EVERY 5 MIN PRN
Status: DISCONTINUED | OUTPATIENT
Start: 2022-04-04 | End: 2022-04-04 | Stop reason: HOSPADM

## 2022-04-04 RX ORDER — ONDANSETRON 2 MG/ML
4 INJECTION INTRAMUSCULAR; INTRAVENOUS
Status: DISCONTINUED | OUTPATIENT
Start: 2022-04-04 | End: 2022-04-04 | Stop reason: HOSPADM

## 2022-04-04 RX ORDER — GLYCOPYRROLATE 1 MG/5 ML
SYRINGE (ML) INTRAVENOUS PRN
Status: DISCONTINUED | OUTPATIENT
Start: 2022-04-04 | End: 2022-04-04 | Stop reason: SDUPTHER

## 2022-04-04 RX ORDER — FENTANYL CITRATE 50 UG/ML
INJECTION, SOLUTION INTRAMUSCULAR; INTRAVENOUS PRN
Status: DISCONTINUED | OUTPATIENT
Start: 2022-04-04 | End: 2022-04-04 | Stop reason: SDUPTHER

## 2022-04-04 RX ORDER — MIDAZOLAM HYDROCHLORIDE 1 MG/ML
2 INJECTION INTRAMUSCULAR; INTRAVENOUS ONCE
Status: COMPLETED | OUTPATIENT
Start: 2022-04-04 | End: 2022-04-04

## 2022-04-04 RX ORDER — OXYCODONE HYDROCHLORIDE 5 MG/1
5-10 TABLET ORAL EVERY 6 HOURS PRN
Qty: 30 TABLET | Refills: 0 | Status: SHIPPED | OUTPATIENT
Start: 2022-04-04 | End: 2022-04-09

## 2022-04-04 RX ORDER — SCOLOPAMINE TRANSDERMAL SYSTEM 1 MG/1
1 PATCH, EXTENDED RELEASE TRANSDERMAL ONCE
Status: DISCONTINUED | OUTPATIENT
Start: 2022-04-04 | End: 2022-04-04

## 2022-04-04 RX ORDER — ALPRAZOLAM 0.5 MG/1
0.5 TABLET ORAL 2 TIMES DAILY
Status: DISCONTINUED | OUTPATIENT
Start: 2022-04-04 | End: 2022-04-05 | Stop reason: HOSPADM

## 2022-04-04 RX ORDER — SODIUM CHLORIDE, SODIUM LACTATE, POTASSIUM CHLORIDE, CALCIUM CHLORIDE 600; 310; 30; 20 MG/100ML; MG/100ML; MG/100ML; MG/100ML
INJECTION, SOLUTION INTRAVENOUS CONTINUOUS
Status: DISCONTINUED | OUTPATIENT
Start: 2022-04-04 | End: 2022-04-04

## 2022-04-04 RX ORDER — LIDOCAINE HYDROCHLORIDE 20 MG/ML
INJECTION, SOLUTION EPIDURAL; INFILTRATION; INTRACAUDAL; PERINEURAL PRN
Status: DISCONTINUED | OUTPATIENT
Start: 2022-04-04 | End: 2022-04-04 | Stop reason: SDUPTHER

## 2022-04-04 RX ORDER — IBUPROFEN 800 MG/1
800 TABLET ORAL EVERY 6 HOURS PRN
Status: ON HOLD | COMMUNITY
End: 2022-04-04 | Stop reason: HOSPADM

## 2022-04-04 RX ORDER — SODIUM CHLORIDE 9 MG/ML
INJECTION, SOLUTION INTRAVENOUS PRN
Status: DISCONTINUED | OUTPATIENT
Start: 2022-04-04 | End: 2022-04-05 | Stop reason: HOSPADM

## 2022-04-04 RX ORDER — DIPHENHYDRAMINE HYDROCHLORIDE 50 MG/ML
12.5 INJECTION INTRAMUSCULAR; INTRAVENOUS ONCE
Status: COMPLETED | OUTPATIENT
Start: 2022-04-04 | End: 2022-04-04

## 2022-04-04 RX ORDER — SODIUM CHLORIDE 0.9 % (FLUSH) 0.9 %
5-40 SYRINGE (ML) INJECTION PRN
Status: DISCONTINUED | OUTPATIENT
Start: 2022-04-04 | End: 2022-04-05 | Stop reason: HOSPADM

## 2022-04-04 RX ORDER — ACETAMINOPHEN 500 MG
1000 TABLET ORAL EVERY 6 HOURS PRN
Qty: 112 TABLET | Refills: 0 | Status: SHIPPED | OUTPATIENT
Start: 2022-04-04

## 2022-04-04 RX ORDER — BUPIVACAINE HYDROCHLORIDE 5 MG/ML
INJECTION, SOLUTION EPIDURAL; INTRACAUDAL
Status: COMPLETED | OUTPATIENT
Start: 2022-04-04 | End: 2022-04-04

## 2022-04-04 RX ORDER — NAPROXEN 500 MG/1
500 TABLET ORAL EVERY 12 HOURS PRN
Qty: 28 TABLET | Refills: 0 | Status: SHIPPED | OUTPATIENT
Start: 2022-04-04

## 2022-04-04 RX ORDER — CYCLOBENZAPRINE HCL 10 MG
10 TABLET ORAL EVERY 6 HOURS PRN
Qty: 50 TABLET | Refills: 0 | Status: SHIPPED | OUTPATIENT
Start: 2022-04-04 | End: 2022-04-14

## 2022-04-04 RX ORDER — CEFAZOLIN SODIUM 1 G/3ML
INJECTION, POWDER, FOR SOLUTION INTRAMUSCULAR; INTRAVENOUS PRN
Status: DISCONTINUED | OUTPATIENT
Start: 2022-04-04 | End: 2022-04-04 | Stop reason: SDUPTHER

## 2022-04-04 RX ORDER — DOCUSATE SODIUM 100 MG/1
100 CAPSULE, LIQUID FILLED ORAL 2 TIMES DAILY
Qty: 20 CAPSULE | Refills: 0 | Status: SHIPPED | OUTPATIENT
Start: 2022-04-04

## 2022-04-04 RX ORDER — POLYETHYLENE GLYCOL 3350 17 G/17G
17 POWDER, FOR SOLUTION ORAL DAILY PRN
Status: DISCONTINUED | OUTPATIENT
Start: 2022-04-04 | End: 2022-04-05 | Stop reason: HOSPADM

## 2022-04-04 RX ORDER — DEXAMETHASONE SODIUM PHOSPHATE 10 MG/ML
INJECTION INTRAMUSCULAR; INTRAVENOUS PRN
Status: DISCONTINUED | OUTPATIENT
Start: 2022-04-04 | End: 2022-04-04 | Stop reason: SDUPTHER

## 2022-04-04 RX ORDER — ONDANSETRON 2 MG/ML
INJECTION INTRAMUSCULAR; INTRAVENOUS PRN
Status: DISCONTINUED | OUTPATIENT
Start: 2022-04-04 | End: 2022-04-04 | Stop reason: SDUPTHER

## 2022-04-04 RX ORDER — PROPOFOL 10 MG/ML
INJECTION, EMULSION INTRAVENOUS PRN
Status: DISCONTINUED | OUTPATIENT
Start: 2022-04-04 | End: 2022-04-04 | Stop reason: SDUPTHER

## 2022-04-04 RX ADMIN — Medication 0.6 MG: at 19:57

## 2022-04-04 RX ADMIN — SODIUM CHLORIDE, POTASSIUM CHLORIDE, SODIUM LACTATE AND CALCIUM CHLORIDE: 600; 310; 30; 20 INJECTION, SOLUTION INTRAVENOUS at 16:07

## 2022-04-04 RX ADMIN — MIDAZOLAM 2 MG: 1 INJECTION INTRAMUSCULAR; INTRAVENOUS at 14:14

## 2022-04-04 RX ADMIN — Medication 10 MG: at 17:19

## 2022-04-04 RX ADMIN — ONDANSETRON 4 MG: 2 INJECTION INTRAMUSCULAR; INTRAVENOUS at 16:22

## 2022-04-04 RX ADMIN — DEXAMETHASONE SODIUM PHOSPHATE 10 MG: 10 INJECTION INTRAMUSCULAR; INTRAVENOUS at 16:22

## 2022-04-04 RX ADMIN — BUPIVACAINE HYDROCHLORIDE 35 ML: 5 INJECTION, SOLUTION EPIDURAL; INTRACAUDAL; PERINEURAL at 14:48

## 2022-04-04 RX ADMIN — Medication 25 MCG: at 16:15

## 2022-04-04 RX ADMIN — CEFAZOLIN SODIUM 2000 MG: 1 INJECTION, POWDER, FOR SOLUTION INTRAMUSCULAR; INTRAVENOUS at 20:22

## 2022-04-04 RX ADMIN — Medication 200 MCG: at 17:06

## 2022-04-04 RX ADMIN — Medication 0.2 MG: at 17:18

## 2022-04-04 RX ADMIN — DIPHENHYDRAMINE HYDROCHLORIDE 12.5 MG: 50 INJECTION, SOLUTION INTRAMUSCULAR; INTRAVENOUS at 14:04

## 2022-04-04 RX ADMIN — Medication 200 MCG: at 16:57

## 2022-04-04 RX ADMIN — CEFAZOLIN SODIUM 2000 MG: 10 INJECTION, POWDER, FOR SOLUTION INTRAVENOUS at 16:22

## 2022-04-04 RX ADMIN — ACETAMINOPHEN 1000 MG: 500 TABLET ORAL at 22:54

## 2022-04-04 RX ADMIN — LIDOCAINE HYDROCHLORIDE 80 MG: 20 INJECTION, SOLUTION EPIDURAL; INFILTRATION; INTRACAUDAL; PERINEURAL at 16:15

## 2022-04-04 RX ADMIN — ROCURONIUM BROMIDE 50 MG: 10 INJECTION, SOLUTION INTRAVENOUS at 16:15

## 2022-04-04 RX ADMIN — ALPRAZOLAM 0.5 MG: 0.5 TABLET ORAL at 22:54

## 2022-04-04 RX ADMIN — SODIUM CHLORIDE, POTASSIUM CHLORIDE, SODIUM LACTATE AND CALCIUM CHLORIDE: 600; 310; 30; 20 INJECTION, SOLUTION INTRAVENOUS at 19:02

## 2022-04-04 RX ADMIN — PROPOFOL 160 MG: 10 INJECTION, EMULSION INTRAVENOUS at 16:15

## 2022-04-04 RX ADMIN — Medication 3 MG: at 19:57

## 2022-04-04 RX ADMIN — Medication 25 MCG: at 18:09

## 2022-04-04 ASSESSMENT — PULMONARY FUNCTION TESTS
PIF_VALUE: 16
PIF_VALUE: 16
PIF_VALUE: 17
PIF_VALUE: 18
PIF_VALUE: 12
PIF_VALUE: 12
PIF_VALUE: 15
PIF_VALUE: 16
PIF_VALUE: 16
PIF_VALUE: 0
PIF_VALUE: 16
PIF_VALUE: 1
PIF_VALUE: 18
PIF_VALUE: 18
PIF_VALUE: 12
PIF_VALUE: 12
PIF_VALUE: 17
PIF_VALUE: 11
PIF_VALUE: 12
PIF_VALUE: 13
PIF_VALUE: 15
PIF_VALUE: 17
PIF_VALUE: 18
PIF_VALUE: 16
PIF_VALUE: 16
PIF_VALUE: 15
PIF_VALUE: 14
PIF_VALUE: 17
PIF_VALUE: 15
PIF_VALUE: 15
PIF_VALUE: 17
PIF_VALUE: 14
PIF_VALUE: 14
PIF_VALUE: 17
PIF_VALUE: 16
PIF_VALUE: 18
PIF_VALUE: 18
PIF_VALUE: 15
PIF_VALUE: 17
PIF_VALUE: 16
PIF_VALUE: 15
PIF_VALUE: 18
PIF_VALUE: 16
PIF_VALUE: 14
PIF_VALUE: 12
PIF_VALUE: 17
PIF_VALUE: 14
PIF_VALUE: 16
PIF_VALUE: 4
PIF_VALUE: 16
PIF_VALUE: 15
PIF_VALUE: 28
PIF_VALUE: 14
PIF_VALUE: 15
PIF_VALUE: 14
PIF_VALUE: 10
PIF_VALUE: 15
PIF_VALUE: 16
PIF_VALUE: 16
PIF_VALUE: 14
PIF_VALUE: 17
PIF_VALUE: 14
PIF_VALUE: 13
PIF_VALUE: 15
PIF_VALUE: 16
PIF_VALUE: 12
PIF_VALUE: 14
PIF_VALUE: 22
PIF_VALUE: 12
PIF_VALUE: 18
PIF_VALUE: 17
PIF_VALUE: 18
PIF_VALUE: 17
PIF_VALUE: 17
PIF_VALUE: 18
PIF_VALUE: 17
PIF_VALUE: 15
PIF_VALUE: 16
PIF_VALUE: 16
PIF_VALUE: 14
PIF_VALUE: 12
PIF_VALUE: 12
PIF_VALUE: 13
PIF_VALUE: 15
PIF_VALUE: 12
PIF_VALUE: 16
PIF_VALUE: 14
PIF_VALUE: 15
PIF_VALUE: 16
PIF_VALUE: 16
PIF_VALUE: 13
PIF_VALUE: 15
PIF_VALUE: 13
PIF_VALUE: 17
PIF_VALUE: 15
PIF_VALUE: 12
PIF_VALUE: 15
PIF_VALUE: 17
PIF_VALUE: 0
PIF_VALUE: 18
PIF_VALUE: 16
PIF_VALUE: 18
PIF_VALUE: 16
PIF_VALUE: 18
PIF_VALUE: 14
PIF_VALUE: 16
PIF_VALUE: 13
PIF_VALUE: 12
PIF_VALUE: 3
PIF_VALUE: 13
PIF_VALUE: 15
PIF_VALUE: 0
PIF_VALUE: 17
PIF_VALUE: 6
PIF_VALUE: 17
PIF_VALUE: 16
PIF_VALUE: 14
PIF_VALUE: 18
PIF_VALUE: 17
PIF_VALUE: 17
PIF_VALUE: 14
PIF_VALUE: 13
PIF_VALUE: 12
PIF_VALUE: 14
PIF_VALUE: 15
PIF_VALUE: 3
PIF_VALUE: 18
PIF_VALUE: 0
PIF_VALUE: 14
PIF_VALUE: 13
PIF_VALUE: 15
PIF_VALUE: 13
PIF_VALUE: 16
PIF_VALUE: 16
PIF_VALUE: 13
PIF_VALUE: 18
PIF_VALUE: 17
PIF_VALUE: 17
PIF_VALUE: 14
PIF_VALUE: 14
PIF_VALUE: 18
PIF_VALUE: 16
PIF_VALUE: 12
PIF_VALUE: 16
PIF_VALUE: 18
PIF_VALUE: 17
PIF_VALUE: 14
PIF_VALUE: 17
PIF_VALUE: 14
PIF_VALUE: 17
PIF_VALUE: 15
PIF_VALUE: 15
PIF_VALUE: 12
PIF_VALUE: 15
PIF_VALUE: 14
PIF_VALUE: 17
PIF_VALUE: 16
PIF_VALUE: 17
PIF_VALUE: 18
PIF_VALUE: 2
PIF_VALUE: 18
PIF_VALUE: 18
PIF_VALUE: 14
PIF_VALUE: 18
PIF_VALUE: 18
PIF_VALUE: 16
PIF_VALUE: 17
PIF_VALUE: 12
PIF_VALUE: 26
PIF_VALUE: 15
PIF_VALUE: 17
PIF_VALUE: 15
PIF_VALUE: 14
PIF_VALUE: 17
PIF_VALUE: 14
PIF_VALUE: 18
PIF_VALUE: 13
PIF_VALUE: 12
PIF_VALUE: 14
PIF_VALUE: 18
PIF_VALUE: 14
PIF_VALUE: 16
PIF_VALUE: 15
PIF_VALUE: 16
PIF_VALUE: 14
PIF_VALUE: 13
PIF_VALUE: 14
PIF_VALUE: 18
PIF_VALUE: 16
PIF_VALUE: 0
PIF_VALUE: 17
PIF_VALUE: 12
PIF_VALUE: 16
PIF_VALUE: 15
PIF_VALUE: 16
PIF_VALUE: 15
PIF_VALUE: 13
PIF_VALUE: 17
PIF_VALUE: 14
PIF_VALUE: 18
PIF_VALUE: 15
PIF_VALUE: 2
PIF_VALUE: 18
PIF_VALUE: 18
PIF_VALUE: 13
PIF_VALUE: 18
PIF_VALUE: 22
PIF_VALUE: 17
PIF_VALUE: 17
PIF_VALUE: 16
PIF_VALUE: 15
PIF_VALUE: 14
PIF_VALUE: 16
PIF_VALUE: 15
PIF_VALUE: 14
PIF_VALUE: 15
PIF_VALUE: 15
PIF_VALUE: 14
PIF_VALUE: 15
PIF_VALUE: 17
PIF_VALUE: 18
PIF_VALUE: 13
PIF_VALUE: 0
PIF_VALUE: 17
PIF_VALUE: 16
PIF_VALUE: 1
PIF_VALUE: 16
PIF_VALUE: 12
PIF_VALUE: 13
PIF_VALUE: 17
PIF_VALUE: 15
PIF_VALUE: 0
PIF_VALUE: 15
PIF_VALUE: 1
PIF_VALUE: 18
PIF_VALUE: 14
PIF_VALUE: 18
PIF_VALUE: 3
PIF_VALUE: 14
PIF_VALUE: 18
PIF_VALUE: 16
PIF_VALUE: 0
PIF_VALUE: 12
PIF_VALUE: 18
PIF_VALUE: 12
PIF_VALUE: 13
PIF_VALUE: 15
PIF_VALUE: 18
PIF_VALUE: 14
PIF_VALUE: 12
PIF_VALUE: 13
PIF_VALUE: 10
PIF_VALUE: 18
PIF_VALUE: 18
PIF_VALUE: 14
PIF_VALUE: 15
PIF_VALUE: 17
PIF_VALUE: 15
PIF_VALUE: 17
PIF_VALUE: 18
PIF_VALUE: 21
PIF_VALUE: 16
PIF_VALUE: 17
PIF_VALUE: 12
PIF_VALUE: 0
PIF_VALUE: 18
PIF_VALUE: 18

## 2022-04-04 ASSESSMENT — PAIN DESCRIPTION - ORIENTATION: ORIENTATION: RIGHT

## 2022-04-04 ASSESSMENT — PAIN SCALES - GENERAL
PAINLEVEL_OUTOF10: 6
PAINLEVEL_OUTOF10: 6

## 2022-04-04 ASSESSMENT — PAIN - FUNCTIONAL ASSESSMENT
PAIN_FUNCTIONAL_ASSESSMENT: PREVENTS OR INTERFERES WITH ALL ACTIVE AND SOME PASSIVE ACTIVITIES
PAIN_FUNCTIONAL_ASSESSMENT: 0-10

## 2022-04-04 ASSESSMENT — LIFESTYLE VARIABLES: SMOKING_STATUS: 1

## 2022-04-04 ASSESSMENT — PAIN DESCRIPTION - DESCRIPTORS: DESCRIPTORS: THROBBING;SHOOTING;ACHING

## 2022-04-04 ASSESSMENT — PAIN DESCRIPTION - LOCATION: LOCATION: ANKLE;LEG

## 2022-04-04 ASSESSMENT — PAIN DESCRIPTION - PAIN TYPE: TYPE: SURGICAL PAIN

## 2022-04-04 NOTE — H&P
History and Physical Service   [facility]    HISTORY AND PHYSICAL EXAMINATION            Date of Evaluation: 4/4/2022  Patient name:  Parul Oscar  MRN:   [id number]  YOB: 1985  PCP:    Syl Nowak MD    History Obtained From:     Patient, medical records    History of Present Illness: This is Parul Oscar a 39 y.o. female who presents today for a ankle open reduction internal fixation tibia, ankle by Dr. Tosin Thomason for tibia ankle fracture right. Patient's chief complaint is right leg pain which occurred after falling out of her shower on 4/3/2022. Patient was evaluated in the emergency department and patient had x-rays showing displaced fracture of the distal tibia. Patient was initially going to be admitted but she signed out AMA to return today for surgery. Patient had long leg posterior splint placed in the emergency department. Patient denies numbness and tingling. Denies fever, chills, shortness of breath, cough, chest pain, open sores or wounds. Denies hx diabetes. Last Ibuprofen 4/3/2022. Past Medical History:     Past Medical History:   Diagnosis Date    Anxiety     Asthma     as a child    Depression         Past Surgical History:     Past Surgical History:   Procedure Laterality Date    LEEP          Medications Prior to Admission:     Prior to Admission medications    Medication Sig Start Date End Date Taking? Authorizing Provider   ibuprofen (ADVIL;MOTRIN) 800 MG tablet Take 800 mg by mouth every 6 hours as needed for Pain   Yes Historical Provider, MD   oxyCODONE-acetaminophen (PERCOCET) 5-325 MG per tablet Take 1 tablet by mouth every 6 hours as needed for Pain for up to 3 days. Intended supply: 3 days.  Take lowest dose possible to manage pain 4/3/22 4/6/22  SWEETIE aKtz - CNP   acetaminophen (TYLENOL) 500 MG tablet Take 1 tablet by mouth 4 times daily as needed for Pain 8/8/21 9/7/21  Venkata Delay, DO   naproxen (NAPROSYN) 500 MG tablet Take 1 tablet by mouth 2 times daily (with meals)  Patient not taking: Reported on 4/4/2022 8/8/21   Tommie Gentleman, DO   ALPRAZolam [facility]) 0.5 MG tablet TAKE 1 TABLET BY MOUTH TWICE A DAY  Patient not taking: Reported on 4/4/2022 5/29/18   Historical Provider, MD        Allergies:     No known allergies    Social History:     Tobacco:    reports that she has been smoking. She has been smoking about 1.00 pack per day. She has never used smokeless tobacco.  Alcohol:      reports no history of alcohol use. Drug Use:  reports current drug use. Drug: Marijuana Jass Sanchezshoaib). Family History:     Family History   Problem Relation Age of Onset    Hypertension Mother     Stroke Mother     Thyroid Disease Mother     Diabetes Father     Heart Disease Father        Review of Systems:     Positive and Negative as described in HPI. CONSTITUTIONAL: negative for fevers, chills, sweats, fatigue, weight loss  HEENT: negative for vision, hearing changes, runny nose, throat pain  RESPIRATORY: negative for shortness of breath, cough, congestion, wheezing. CARDIOVASCULAR: negative for chest pain, palpitations. GASTROINTESTINAL: Acid reflux. negative for nausea, vomiting, diarrhea, constipation, change in bowel habits, abdominal pain   GENITOURINARY: negative for difficulty of urination, burning with urination, frequency   INTEGUMENT: negative for rash, skin lesions, easy bruising   HEMATOLOGIC/LYMPHATIC: negative for swelling/edema   ALLERGIC/IMMUNOLOGIC:  negative for urticaria , itching  ENDOCRINE:  negative increase in drinking, increase in urination, hot or cold intolerance  MUSCULOSKELETAL: See HPI   NEUROLOGICAL: Migraines.  negative for  dizziness, lightheadedness, numbness, pain, tingling extremities  BEHAVIOR/PSYCH: Anxiety and depression    Physical Exam:   /71   Pulse 98   Temp 99.1 °F (37.3 °C) (Temporal)   Resp 16   Ht 5' 2\" (1.575 m)   Wt 132 lb (59.9 kg)   LMP 03/27/2022   SpO2 100%   BMI 24.14 kg/m² Patient's last menstrual period was 03/27/2022. No obstetric history on file. No results for input(s): POCGLU in the last 72 hours. General Appearance:  alert, well appearing, and in no acute distress  Mental status: oriented to person, place, and time with normal affect  Head:  normocephalic, atraumatic. Eye: no icterus, redness, pupils equal and reactive, extraocular eye movements intact, conjunctiva clear  Ear: normal external ear, no discharge, hearing intact  Nose:  no drainage noted  Mouth: Missing teeth. mucous membranes moist  Neck: supple, no carotid bruits, thyroid not palpable  Lungs: Bilateral equal air entry, clear to ausculation, no wheezing, rales or rhonchi, normal effort  Cardiovascular: HR 98 normal rate, regular rhythm, no murmur, gallop, rub. Abdomen: Soft, nontender, nondistended, normal bowel sounds  Neurologic: There are no new focal motor or sensory deficits, normal muscle tone and bulk, no abnormal sensation, normal speech, cranial nerves II through XII grossly intact  Skin: No gross lesions, rashes, bruising or bleeding on exposed skin area  Extremities: Right long leg posterior splint intact. Patient able to wiggle toes to command.  peripheral pulses palpable, no pedal edema or calf pain with palpation  Psych: normal affect     Investigations:      Laboratory Testing:  Recent Results (from the past 24 hour(s))   CBC with Auto Differential    Collection Time: 04/03/22  5:00 PM   Result Value Ref Range    WBC 5.8 3.5 - 11.3 k/uL    RBC 4.32 3.95 - 5.11 m/uL    Hemoglobin 11.6 (L) 11.9 - 15.1 g/dL    Hematocrit 38.2 36.3 - 47.1 %    MCV 88.4 82.6 - 102.9 fL    MCH 26.9 25.2 - 33.5 pg    MCHC 30.4 28.4 - 34.8 g/dL    RDW 15.3 (H) 11.8 - 14.4 %    Platelets 213 182 - 242 k/uL    MPV 9.5 8.1 - 13.5 fL    NRBC Automated 0.0 0.0 per 100 WBC    Seg Neutrophils 52 36 - 65 %    Lymphocytes 40 24 - 43 %    Monocytes 7 3 - 12 %    Eosinophils % 1 1 - 4 %    Basophils 0 0 - 2 %    Immature Granulocytes 0 0 %    Segs Absolute 2.98 1.50 - 8.10 k/uL    Absolute Lymph # 2.35 1.10 - 3.70 k/uL    Absolute Mono # 0.40 0.10 - 1.20 k/uL    Absolute Eos # 0.07 0.00 - 0.44 k/uL    Basophils Absolute <0.03 0.00 - 0.20 k/uL    Absolute Immature Granulocyte 0.00 0.00 - 0.30 k/uL    RBC Morphology ANISOCYTOSIS PRESENT    Basic Metabolic Panel    Collection Time: 04/03/22  5:00 PM   Result Value Ref Range    Glucose 118 (H) 70 - 99 mg/dL    BUN 9 6 - 20 mg/dL    CREATININE 0.60 0.50 - 0.90 mg/dL    Bun/Cre Ratio 15 9 - 20    Calcium 9.1 8.6 - 10.4 mg/dL    Sodium 137 135 - 144 mmol/L    Potassium 4.4 3.7 - 5.3 mmol/L    Chloride 102 98 - 107 mmol/L    CO2 28 20 - 31 mmol/L    Anion Gap 7 (L) 9 - 17 mmol/L    GFR Non-African American >60 >60 mL/min    GFR African American >60 >60 mL/min    GFR Comment         HCG, SERUM, QUALITATIVE    Collection Time: 04/03/22  5:00 PM   Result Value Ref Range    hCG Qual NEGATIVE NEGATIVE   APTT    Collection Time: 04/03/22  5:00 PM   Result Value Ref Range    PTT 31.4 23.9 - 33.8 sec   Protime-INR    Collection Time: 04/03/22  5:00 PM   Result Value Ref Range    Protime 12.1 11.5 - 14.2 sec    INR 0.9    COVID-19, Rapid    Collection Time: 04/04/22  1:21 PM    Specimen: Nasopharyngeal Swab   Result Value Ref Range    Specimen Description . NASOPHARYNGEAL SWAB     SARS-CoV-2, Rapid Not Detected Not Detected       Recent Labs     04/03/22  1700   HGB 11.6*   HCT 38.2   WBC 5.8   MCV 88.4      K 4.4      CO2 28   BUN 9   CREATININE 0.60   GLUCOSE 118*   INR 0.9   PROTIME 12.1   APTT 31.4       Recent Labs     04/04/22  1321   COVID19 Not Detected     Imaging/Diagnostics:    XR TIBIA FIBULA RIGHT (2 VIEWS)    Result Date: 4/3/2022  EXAMINATION: XRAY VIEWS OF THE RIGHT TIBIA AND FIBULA 4/3/2022 5:00 pm COMPARISON: None.  HISTORY: ORDERING SYSTEM PROVIDED HISTORY: fall TECHNOLOGIST PROVIDED HISTORY: fall Reason for Exam: fell Additional signs and symptoms: pt fell today, pain in rt lower leg FINDINGS: There is a displaced oblique fracture of the distal tibia without significant angulation. Component of the fracture extends distally to involve the medial malleolus with intra-articular extension soft tissue swelling is noted. Tibial fracture. XR ANKLE RIGHT (MIN 3 VIEWS)    Result Date: 4/3/2022  EXAMINATION: THREE XRAY VIEWS OF THE RIGHT ANKLE 4/3/2022 5:00 pm COMPARISON: None. HISTORY: ORDERING SYSTEM PROVIDED HISTORY: fall TECHNOLOGIST PROVIDED HISTORY: fall Reason for Exam: fell Additional signs and symptoms: pt fell today, pain in rt lower leg FINDINGS: Component of the distal tibial fracture extends into the joint space and the medial malleolus. There is soft tissue swelling. No dislocation. Fracture as above       Diagnosis:      1. Tibia ankle fracture right    Plans:     1.  Ankle open reduction internal fixation tibia, ankle      SWEETIE Stack - CNP  4/4/2022  1:58 PM

## 2022-04-04 NOTE — PROGRESS NOTES
Bedside nerve block  performed by Dr. Azucena Bradshaw at bedside and timeout performed for safety. Pt tolerated well and VSS.  RLE elevated for comfort and safety, Siderails up x 2 and call light given to pt Pt continuously on monitor

## 2022-04-04 NOTE — ANESTHESIA PRE PROCEDURE
Department of Anesthesiology  Preprocedure Note       Name:  Saeed Phelps   Age:  39 y.o.  :  1985                                          MRN:  [id number]         Date:  2022      Surgeon: Westley Ritter):  Анна Anne MD    Procedure: Procedure(s):  ANKLE OPEN REDUCTION INTERNAL FIXATION TIBIA, ANKLE, SNYTHESIS PLATES, SCREWS. JOESPH    Medications prior to admission:   Prior to Admission medications    Medication Sig Start Date End Date Taking? Authorizing Provider   ibuprofen (ADVIL;MOTRIN) 800 MG tablet Take 800 mg by mouth every 6 hours as needed for Pain   Yes Historical Provider, MD   oxyCODONE-acetaminophen (PERCOCET) 5-325 MG per tablet Take 1 tablet by mouth every 6 hours as needed for Pain for up to 3 days. Intended supply: 3 days.  Take lowest dose possible to manage pain 4/3/22 4/6/22  SWEETIE Youssef CNP   acetaminophen (TYLENOL) 500 MG tablet Take 1 tablet by mouth 4 times daily as needed for Pain 21  Darylene Bors, DO   naproxen (NAPROSYN) 500 MG tablet Take 1 tablet by mouth 2 times daily (with meals)  Patient not taking: Reported on 2022   Darylene Bors, DO   ALPRAZolam Trenia Fury) 0.5 MG tablet TAKE 1 TABLET BY MOUTH TWICE A DAY  Patient not taking: Reported on 2022   Historical Provider, MD       Current medications:    Current Facility-Administered Medications   Medication Dose Route Frequency Provider Last Rate Last Admin    lactated ringers infusion   IntraVENous Continuous Ndal Amber Escamilla MD        midazolam (VERSED) injection 2 mg  2 mg IntraVENous Once Ayanna Ponce MD        scopolamine (TRANSDERM-SCOP) transdermal patch 1 patch  1 patch TransDERmal Once Ayanna Ponce MD        diphenhydrAMINE (BENADRYL) injection 12.5 mg  12.5 mg IntraVENous Once Ayanna Ponce MD        sodium chloride flush 0.9 % injection 5-40 mL  5-40 mL IntraVENous 2 times per day Ayanna Ponce MD        sodium chloride flush 0.9 % injection 5-40 mL  5-40 mL IntraVENous PRN Darío Villa MD        0.9 % sodium chloride infusion   IntraVENous PRN Darío Villa MD        fentaNYL (SUBLIMAZE) injection 25 mcg  25 mcg IntraVENous Q5 Min PRN Minh Perera MD        HYDROmorphone HCl PF (DILAUDID) injection 0.25 mg  0.25 mg IntraVENous Q5 Min PRN Darío Villa MD        ondansetron [facility]) injection 4 mg  4 mg IntraVENous Once PRN Darío Villa MD           Allergies: Allergies   Allergen Reactions    No Known Allergies        Problem List:    Patient Active Problem List   Diagnosis Code    Closed fracture of lower end of right radius with routine healing S52.501D       Past Medical History:        Diagnosis Date    Anxiety     Asthma     as a child    Depression        Past Surgical History:        Procedure Laterality Date    LEEP         Social History:    Social History     Tobacco Use    Smoking status: Current Every Day Smoker     Packs/day: 1.00    Smokeless tobacco: Never Used   Substance Use Topics    Alcohol use: No                                Ready to quit: Not Answered  Counseling given: Not Answered      Vital Signs (Current):   Vitals:    04/04/22 1334 04/04/22 1345   BP:  103/71   Pulse:  98   Resp:  16   Temp:  99.1 °F (37.3 °C)   TempSrc:  Temporal   SpO2:  100%   Weight: 132 lb (59.9 kg)    Height: 5' 2\" (1.575 m)                                               BP Readings from Last 3 Encounters:   04/04/22 103/71   04/03/22 119/89   08/08/21 105/77       NPO Status: Time of last liquid consumption: 2000                        Time of last solid consumption: 2000                        Date of last liquid consumption: 04/03/22                        Date of last solid food consumption: 04/03/22    BMI:   Wt Readings from Last 3 Encounters:   04/04/22 132 lb (59.9 kg)   04/03/22 128 lb (58.1 kg)   08/08/21 135 lb (61.2 kg)     Body mass index is 24.14 kg/m².     CBC:   Lab Results   Component Value Date    WBC 5.8 04/03/2022    RBC 4.32 04/03/2022    HGB 11.6 04/03/2022    HCT 38.2 04/03/2022    MCV 88.4 04/03/2022    RDW 15.3 04/03/2022     04/03/2022       CMP:   Lab Results   Component Value Date     04/03/2022    K 4.4 04/03/2022     04/03/2022    CO2 28 04/03/2022    BUN 9 04/03/2022    CREATININE 0.60 04/03/2022    GFRAA >60 04/03/2022    LABGLOM >60 04/03/2022    GLUCOSE 118 04/03/2022    CALCIUM 9.1 04/03/2022       POC Tests: No results for input(s): POCGLU, POCNA, POCK, POCCL, POCBUN, POCHEMO, POCHCT in the last 72 hours. Coags:   Lab Results   Component Value Date    PROTIME 12.1 04/03/2022    INR 0.9 04/03/2022    APTT 31.4 04/03/2022       HCG (If Applicable): No results found for: PREGTESTUR, PREGSERUM, HCG, HCGQUANT     ABGs: No results found for: PHART, PO2ART, XWP8NNR, RLR6OGG, BEART, U9FOBQBZ     Type & Screen (If Applicable):  No results found for: LABABO, LABRH    Drug/Infectious Status (If Applicable):  No results found for: HIV, HEPCAB    COVID-19 Screening (If Applicable):   Lab Results   Component Value Date    COVID19 Not Detected 04/04/2022           Anesthesia Evaluation   no history of anesthetic complications:   Airway: Mallampati: III  TM distance: >3 FB   Neck ROM: full  Mouth opening: > = 3 FB and < 3 FB Dental:          Pulmonary:normal exam    (+) asthma: current smoker (tobacco and mj)                           Cardiovascular:  Exercise tolerance: good (>4 METS),       (-)  angina                Neuro/Psych:   (+) depression/anxiety             GI/Hepatic/Renal: Neg GI/Hepatic/Renal ROS            Endo/Other: Negative Endo/Other ROS                     ROS comment: r side tmj   Abdominal:             Vascular: Other Findings:             Anesthesia Plan      general and regional     ASA 2       Induction: intravenous. MIPS: Postoperative opioids intended and Prophylactic antiemetics administered. Anesthetic plan and risks discussed with patient.       Plan discussed with CRNA and attending.     Attending anesthesiologist reviewed and agrees with Nelly Aguilera MD   4/4/2022

## 2022-04-04 NOTE — CONSULTS
Orthopedic Surgery Consult  (Dr. Shameka Horta)                   CC/Reason for consult:  Right tibia fracture    HPI:    The patient is a 39 y.o. female patient acquired a right tibia fracture after she fell out of the shower on 4/3/22. She was brought to ED and subsequently placed in a splint. Ortho consulted for further management. Patient NPO now. Denies any new injuries. Denies numbness, tingling, fevers, chills, shortness of breath, or chest pain. Past Medical History:    Past Medical History:   Diagnosis Date    Anxiety     Asthma     as a child    Depression        Past Surgical History:    Past Surgical History:   Procedure Laterality Date    LEEP         Medications Prior to Admission:   Prior to Admission medications    Medication Sig Start Date End Date Taking? Authorizing Provider   ibuprofen (ADVIL;MOTRIN) 800 MG tablet Take 800 mg by mouth every 6 hours as needed for Pain   Yes Historical Provider, MD   oxyCODONE-acetaminophen (PERCOCET) 5-325 MG per tablet Take 1 tablet by mouth every 6 hours as needed for Pain for up to 3 days. Intended supply: 3 days.  Take lowest dose possible to manage pain 4/3/22 4/6/22  SWEETIE Fields - CNP   acetaminophen (TYLENOL) 500 MG tablet Take 1 tablet by mouth 4 times daily as needed for Pain 8/8/21 9/7/21  Jeff Cain DO   naproxen (NAPROSYN) 500 MG tablet Take 1 tablet by mouth 2 times daily (with meals)  Patient not taking: Reported on 4/4/2022 8/8/21   Jeff Cain DO   ALPRAZolam Stefania Wilkin) 0.5 MG tablet TAKE 1 TABLET BY MOUTH TWICE A DAY  Patient not taking: Reported on 4/4/2022 5/29/18   Historical Provider, MD       Allergies:    No known allergies    Social History:   Social History     Socioeconomic History    Marital status: Single     Spouse name: None    Number of children: None    Years of education: None    Highest education level: None   Occupational History    None   Tobacco Use    Smoking status: Current Every Day Smoker     Packs/day: 1.00    Smokeless tobacco: Never Used   Vaping Use    Vaping Use: Never used   Substance and Sexual Activity    Alcohol use: No    Drug use: Yes     Types: Marijuana Maria Del Rosario Miranda     Comment: 04/03/2022 last used     Sexual activity: None   Other Topics Concern    None   Social History Narrative    None     Social Determinants of Health     Financial Resource Strain:     Difficulty of Paying Living Expenses: Not on file   Food Insecurity:     Worried About Running Out of Food in the Last Year: Not on file    Luke of Food in the Last Year: Not on file   Transportation Needs:     Lack of Transportation (Medical): Not on file    Lack of Transportation (Non-Medical): Not on file   Physical Activity:     Days of Exercise per Week: Not on file    Minutes of Exercise per Session: Not on file   Stress:     Feeling of Stress : Not on file   Social Connections:     Frequency of Communication with Friends and Family: Not on file    Frequency of Social Gatherings with Friends and Family: Not on file    Attends [employer] Services: Not on file    Active Member of [address] [place] [address] or Organizations: Not on file    Attends Club or Organization Meetings: Not on file    Marital Status: Not on file   Intimate Partner Violence:     Fear of Current or Ex-Partner: Not on file    Emotionally Abused: Not on file    Physically Abused: Not on file    Sexually Abused: Not on file   Housing Stability:     Unable to Pay for Housing in the Last Year: Not on file    Number of [place] in the Last Year: Not on file    Unstable Housing in the Last Year: Not on file       Family History:  Family History   Problem Relation Age of Onset    Hypertension Mother     Stroke Mother     Thyroid Disease Mother     Diabetes Father     Heart Disease Father        REVIEW OF SYSTEMS:    Constitutional: Negative for fever and chills. Respiratory: Negative for cough, shortness of breath and wheezing.     Cardiovascular: Negative for chest pain and palpitations. Gastrointestinal: Negative for nausea. Negative for vomiting. Musculoskeletal: Positive for right leg pain. See HPI   Skin: Negative for itching and rash. PHYSICAL EXAM:  Vitals:    04/04/22 1334 04/04/22 1345   BP:  103/71   Pulse:  98   Resp:  16   Temp:  99.1 °F (37.3 °C)   TempSrc:  Temporal   SpO2:  100%   Weight: 132 lb (59.9 kg)    Height: 5' 2\" (1.575 m)        Gen: AAOx3, NAD, cooperative     Head: Normocephalic, atraumatic     Chest: Non labored breathing, b/l clavicles without TTP, crepitus, step off, or deformity    Cardiovascular: Regular rate, no dependent edema, distal pulses 2+     Respiratory: Chest symmetric, no accessory muscle use, normal respirations     RLE: Moderate swelling. Splint in place No ecchymosis or deformities. Skin intact. Tender to palpation. Compartments soft and compressible. EHL/FHL/TA/GSC gross motor intact. Sural, saphenous, superificial/deep peroneal, and plantar nerve distribution SILT. Foot and toes warm and well-perfused w/ BCR; DP pulses 2+.     LABS:  Lab Results   Component Value Date    WBC 5.8 04/03/2022    HGB 11.6 (L) 04/03/2022    HCT 38.2 04/03/2022     04/03/2022     04/03/2022    K 4.4 04/03/2022     04/03/2022    CREATININE 0.60 04/03/2022    BUN 9 04/03/2022    CO2 28 04/03/2022    INR 0.9 04/03/2022       Radiology:    X-ray right tibia and ankle demonstrate distal third tibial shaft fracture with extension into the medial malleolus    A/P: 39 y.o. female being seen for right tibial shaft fracture    -Plan for surgery today with Dr. Radha Ayala  -Weight bearing: Non weight bearing  -NPO now  -IM primary  -Pain control per primary  -Maintain splint  -Ice and elevation for pain/swelling  -DVT ppx: Hold chemical AC, EPC, Chemical AC per primary.  -Follow up with Dr. Radha Ayala in 10 - 14 days after surgery  -Please page Ortho with any questions or concerns    ----------------------------------------  Renetta Brown DO  PGY-4, [facility] [address], [place], [place]

## 2022-04-04 NOTE — ANESTHESIA PROCEDURE NOTES
Peripheral Block    Patient location during procedure: pre-op  Start time: 4/4/2022 2:34 PM  End time: 4/4/2022 2:44 PM  Staffing  Performed: anesthesiologist   Anesthesiologist: Lázaro Jasso MD  Preanesthetic Checklist  Completed: patient identified, IV checked, site marked, risks and benefits discussed, surgical consent, monitors and equipment checked, pre-op evaluation, timeout performed, anesthesia consent given, oxygen available and patient being monitored  Peripheral Block  Patient position: supine  Prep: ChloraPrep  Patient monitoring: continuous capnometry, frequent blood pressure checks, IV access, continuous pulse ox and cardiac monitor  Block type: Femoral and Sciatic  Laterality: right  Injection technique: single-shot  Guidance: ultrasound guided  Local infiltration: lidocaine (8mg decadron)  Infiltration strength: 1 %  Dose: 3 mL  Provider prep: mask and sterile gloves  Local infiltration: lidocaine (8mg decadron)  Needle  Needle type: pencil-tip   Needle gauge: 20 G  Needle localization: ultrasound guidance  Assessment  Injection assessment: no paresthesia on injection, local visualized surrounding nerve on ultrasound and negative aspiration for heme  Paresthesia pain: none  Slow fractionated injection: yes  Hemodynamics: stable  Additional Notes  r popliteal and adductor canal blocks, equal volumes of local anesthetic mixture, ultrasound   preprocedure ready time 1434  Medications Administered  Bupivacaine (MARCAINE) PF injection 0.5%, 35 mL  Reason for block: procedure for pain, post-op pain management and at surgeon's request
Additional Safety/Bands:

## 2022-04-05 VITALS
SYSTOLIC BLOOD PRESSURE: 120 MMHG | TEMPERATURE: 97.2 F | HEART RATE: 86 BPM | RESPIRATION RATE: 16 BRPM | BODY MASS INDEX: 24.29 KG/M2 | DIASTOLIC BLOOD PRESSURE: 79 MMHG | HEIGHT: 62 IN | OXYGEN SATURATION: 97 % | WEIGHT: 132 LBS

## 2022-04-05 PROBLEM — F32.A CHRONIC DEPRESSION: Status: ACTIVE | Noted: 2017-05-25

## 2022-04-05 LAB
ABSOLUTE EOS #: <0.03 K/UL (ref 0–0.44)
ABSOLUTE IMMATURE GRANULOCYTE: 0.03 K/UL (ref 0–0.3)
ABSOLUTE LYMPH #: 0.97 K/UL (ref 1.1–3.7)
ABSOLUTE MONO #: 0.42 K/UL (ref 0.1–1.2)
ANION GAP SERPL CALCULATED.3IONS-SCNC: 11 MMOL/L (ref 9–17)
BASOPHILS # BLD: 0 % (ref 0–2)
BASOPHILS ABSOLUTE: <0.03 K/UL (ref 0–0.2)
BUN BLDV-MCNC: 7 MG/DL (ref 6–20)
BUN/CREAT BLD: 11 (ref 9–20)
CALCIUM SERPL-MCNC: 8.5 MG/DL (ref 8.6–10.4)
CHLORIDE BLD-SCNC: 103 MMOL/L (ref 98–107)
CO2: 23 MMOL/L (ref 20–31)
CREAT SERPL-MCNC: 0.61 MG/DL (ref 0.5–0.9)
EOSINOPHILS RELATIVE PERCENT: 0 % (ref 1–4)
GFR AFRICAN AMERICAN: >60 ML/MIN
GFR NON-AFRICAN AMERICAN: >60 ML/MIN
GFR SERPL CREATININE-BSD FRML MDRD: ABNORMAL ML/MIN/{1.73_M2}
GLUCOSE BLD-MCNC: 132 MG/DL (ref 70–99)
HCT VFR BLD CALC: 35.6 % (ref 36.3–47.1)
HEMOGLOBIN: 10.7 G/DL (ref 11.9–15.1)
IMMATURE GRANULOCYTES: 0 %
LYMPHOCYTES # BLD: 8 % (ref 24–43)
MCH RBC QN AUTO: 26.7 PG (ref 25.2–33.5)
MCHC RBC AUTO-ENTMCNC: 30.1 G/DL (ref 28.4–34.8)
MCV RBC AUTO: 88.8 FL (ref 82.6–102.9)
MONOCYTES # BLD: 4 % (ref 3–12)
NRBC AUTOMATED: 0 PER 100 WBC
PDW BLD-RTO: 14.9 % (ref 11.8–14.4)
PLATELET # BLD: 376 K/UL (ref 138–453)
PMV BLD AUTO: 9.9 FL (ref 8.1–13.5)
POTASSIUM SERPL-SCNC: 4.4 MMOL/L (ref 3.7–5.3)
RBC # BLD: 4.01 M/UL (ref 3.95–5.11)
RBC # BLD: ABNORMAL 10*6/UL
SEG NEUTROPHILS: 88 % (ref 36–65)
SEGMENTED NEUTROPHILS ABSOLUTE COUNT: 10.74 K/UL (ref 1.5–8.1)
SODIUM BLD-SCNC: 137 MMOL/L (ref 135–144)
WBC # BLD: 12.2 K/UL (ref 3.5–11.3)

## 2022-04-05 PROCEDURE — 6370000000 HC RX 637 (ALT 250 FOR IP): Performed by: ORTHOPAEDIC SURGERY

## 2022-04-05 PROCEDURE — G0378 HOSPITAL OBSERVATION PER HR: HCPCS

## 2022-04-05 PROCEDURE — 2580000003 HC RX 258: Performed by: STUDENT IN AN ORGANIZED HEALTH CARE EDUCATION/TRAINING PROGRAM

## 2022-04-05 PROCEDURE — 6370000000 HC RX 637 (ALT 250 FOR IP): Performed by: STUDENT IN AN ORGANIZED HEALTH CARE EDUCATION/TRAINING PROGRAM

## 2022-04-05 PROCEDURE — 97116 GAIT TRAINING THERAPY: CPT

## 2022-04-05 PROCEDURE — 99253 IP/OBS CNSLTJ NEW/EST LOW 45: CPT | Performed by: NURSE PRACTITIONER

## 2022-04-05 PROCEDURE — 80048 BASIC METABOLIC PNL TOTAL CA: CPT

## 2022-04-05 PROCEDURE — 85025 COMPLETE CBC W/AUTO DIFF WBC: CPT

## 2022-04-05 PROCEDURE — 97165 OT EVAL LOW COMPLEX 30 MIN: CPT

## 2022-04-05 PROCEDURE — 6360000002 HC RX W HCPCS: Performed by: STUDENT IN AN ORGANIZED HEALTH CARE EDUCATION/TRAINING PROGRAM

## 2022-04-05 PROCEDURE — 97161 PT EVAL LOW COMPLEX 20 MIN: CPT

## 2022-04-05 PROCEDURE — 97530 THERAPEUTIC ACTIVITIES: CPT

## 2022-04-05 PROCEDURE — 36415 COLL VENOUS BLD VENIPUNCTURE: CPT

## 2022-04-05 PROCEDURE — 96372 THER/PROPH/DIAG INJ SC/IM: CPT

## 2022-04-05 PROCEDURE — 96365 THER/PROPH/DIAG IV INF INIT: CPT

## 2022-04-05 PROCEDURE — 96366 THER/PROPH/DIAG IV INF ADDON: CPT

## 2022-04-05 RX ADMIN — ACETAMINOPHEN 1000 MG: 500 TABLET ORAL at 10:50

## 2022-04-05 RX ADMIN — SODIUM CHLORIDE, PRESERVATIVE FREE 10 ML: 5 INJECTION INTRAVENOUS at 09:17

## 2022-04-05 RX ADMIN — ACETAMINOPHEN 1000 MG: 500 TABLET ORAL at 06:45

## 2022-04-05 RX ADMIN — CEFAZOLIN SODIUM 2000 MG: 10 INJECTION, POWDER, FOR SOLUTION INTRAVENOUS at 09:20

## 2022-04-05 RX ADMIN — CEFAZOLIN SODIUM 2000 MG: 10 INJECTION, POWDER, FOR SOLUTION INTRAVENOUS at 01:24

## 2022-04-05 RX ADMIN — OXYCODONE HYDROCHLORIDE 5 MG: 5 TABLET ORAL at 06:44

## 2022-04-05 RX ADMIN — ALPRAZOLAM 0.5 MG: 0.5 TABLET ORAL at 09:20

## 2022-04-05 RX ADMIN — OXYCODONE HYDROCHLORIDE 10 MG: 5 TABLET ORAL at 10:50

## 2022-04-05 RX ADMIN — ENOXAPARIN SODIUM 40 MG: 40 INJECTION SUBCUTANEOUS at 09:21

## 2022-04-05 ASSESSMENT — PAIN DESCRIPTION - DESCRIPTORS: DESCRIPTORS: ACHING;THROBBING

## 2022-04-05 ASSESSMENT — PAIN SCALES - GENERAL
PAINLEVEL_OUTOF10: 3
PAINLEVEL_OUTOF10: 3
PAINLEVEL_OUTOF10: 6
PAINLEVEL_OUTOF10: 1
PAINLEVEL_OUTOF10: 7
PAINLEVEL_OUTOF10: 6

## 2022-04-05 ASSESSMENT — PAIN DESCRIPTION - FREQUENCY: FREQUENCY: CONTINUOUS

## 2022-04-05 ASSESSMENT — PAIN DESCRIPTION - LOCATION: LOCATION: ANKLE;LEG

## 2022-04-05 ASSESSMENT — PAIN DESCRIPTION - ORIENTATION: ORIENTATION: RIGHT

## 2022-04-05 ASSESSMENT — PAIN DESCRIPTION - PAIN TYPE
TYPE: SURGICAL PAIN
TYPE: SURGICAL PAIN

## 2022-04-05 NOTE — DISCHARGE INSTR - DIET
Good nutrition is important when healing from an illness, injury, or surgery. Follow any nutrition recommendations given to you during your hospital stay. If you were given an oral nutrition supplement while in the hospital, continue to take this supplement at home. You can take it with meals, in-between meals, and/or before bedtime. These supplements can be purchased at most local grocery stores, pharmacies, and chain [employer]-stores. If you have any questions about your diet or nutrition, call the hospital and ask for the dietitian.          Diet as tolerated

## 2022-04-05 NOTE — PROGRESS NOTES
Patient arrived to unit from surgical PACU after having a fracture repair. Patient is very anxious and states she hates being in the hospital. Writer attempted to ease situation with eduction that she will be able to leave sometime in the morning following the doctor rounding. Patient home medications reviewed. Patient given call light and to call for help with ambulation.

## 2022-04-05 NOTE — CONSULTS
[facility]  Office: [address], DO, Evangelina Valentine, DO, Betzaida Oliveira, DO, Antonio Orona, DO, Prudence Cheema MD, Mariella Camilo MD, Mayra Vergara MD, Ldey Doyle MD, Orin Garcia MD, Bisi Loza MD, Kate Solorzano MD, Almaz Pelaez, DO, Emerson Rae, DO, Tushar Ansari MD,  Janene Pacheco, DO, Brayden Alonzo MD, Lulú Hays MD, Dian Tapia MD, Tessa Flores, DO, Cyndy Castleman, MD, Mohini Freitas MD, Darrell Oconnell, [facility], [facility], [facility], Elizabeth Wong, CNP, Leonel Gibbs, CNP, Hemant Gracia, CNP, Roopa Robles, CNP, Melissa Davila, CNP, Delmy Kay PA-C, Eliezer Robles, [facility], Art Rodrigues, CNP, Stiven Blanton, CNP, Emeterio Garcia, CNP, Alcides Del Castillo, [facility], [place], [facility], Kary Palacios, CNP, Charisma Hernandez, CNP, Raquel Carrera, [facility] / HISTORY AND PHYSICAL EXAMINATION            Date:   4/5/2022  Patient name:  Dariela Wilson  Date of admission:  4/4/2022  1:11 PM  MRN:   [id number]  Account:  [de-identified]  YOB: 1985  PCP:    Jewel Santos MD  Room:   2024/2024-01  Code Status:    Full Code    Physician Requesting Consult: Elidia Caruso MD    Reason for Consult: Medical management    Chief Complaint:     Right lower extremity pain status post fall    History Obtained From:     patient    History of Present Illness:     Patient reports she was getting out of the shower when she tripped and fell in a twisting fashion. Patient had severe right lower extremity pain and was subsequently transported to the emergency department. Patient was found to have a spiral fracture of the tibia. This was addressed by orthopedics who did an internal fixation and operative notes are reviewed, procedure was completed without significant complication. Patient is seen postoperatively and she reports only minimal postoperative pain.   Patient reports no major medical problems that she is aware of. Patient is found to be mildly hyperglycemic however blood sugars were taken postoperatively and they were not fasting therefore we recommend outpatient follow-up with PCP. Patient also reports that she has had multiple nitro pregnancies and we therefore recommend she have outpatient testing for bone density and need for possible mineral replacement. Patient reports that she has recently become established with a new PCP and she will see them in a few weeks. Patient would like to proceed with outpatient treatment options and would support this as internal medicine sees no need for further hospitalization. Comfortable with discharge at this time. Past Medical History:     Past Medical History:   Diagnosis Date    Anxiety     Asthma     as a child    Depression         Past Surgical History:     Past Surgical History:   Procedure Laterality Date    FRACTURE SURGERY      LEEP          Medications Prior to Admission:     Prior to Admission medications    Medication Sig Start Date End Date Taking? Authorizing Provider   acetaminophen (TYLENOL) 500 MG tablet Take 2 tablets by mouth every 6 hours as needed for Pain 4/4/22  Yes Misti Chatman DO   oxyCODONE (ROXICODONE) 5 MG immediate release tablet Take 1-2 tablets by mouth every 6 hours as needed for Pain for up to 5 days. 4/4/22 4/9/22 Yes Misti Chatman DO   cyclobenzaprine (FLEXERIL) 10 MG tablet Take 1 tablet by mouth every 6 hours as needed for Muscle spasms 4/4/22 4/14/22 Yes Misti Chatman DO   naproxen (NAPROSYN) 500 MG tablet Take 1 tablet by mouth every 12 hours as needed for Pain 4/4/22  Yes Misti Chatman DO   gabapentin (NEURONTIN) 100 MG capsule Take 1 capsule by mouth 3 times daily for 14 days.  4/4/22 4/18/22 Yes Misti Chatman DO   docusate sodium (COLACE) 100 MG capsule Take 1 capsule by mouth 2 times daily 4/4/22  Yes Misti Chatman DO   naproxen (NAPROSYN) 500 MG tablet Take 1 tablet by mouth every 12 hours as needed for Pain 22  Yes Ilona Running, DO   ALPRAZolam Adán Jones) 0.5 MG tablet TAKE 1 TABLET BY MOUTH TWICE A DAY 18   Historical Provider, MD        Allergies:     No known allergies    Social History:     Tobacco:    reports that she has been smoking. She has been smoking about 1.00 pack per day. She has never used smokeless tobacco.  Alcohol:      reports no history of alcohol use. Drug Use:  reports current drug use. Drug: Marijuana [employer]). Family History:     Family History   Problem Relation Age of Onset    Hypertension Mother     Stroke Mother     Thyroid Disease Mother     Diabetes Father     Heart Disease Father        Review of Systems:     Positive and Negative as described in HPI. CONSTITUTIONAL:  negative for fevers, chills, sweats, fatigue, weight loss  HEENT:  negative for vision, hearing changes, runny nose, throat pain  RESPIRATORY:  negative for shortness of breath, cough, congestion, wheezing. CARDIOVASCULAR:  negative for chest pain, palpitations.   GASTROINTESTINAL:  negative for nausea, vomiting, diarrhea, constipation, change in bowel habits, abdominal pain   GENITOURINARY:  negative for difficulty of urination, burning with urination, frequency   INTEGUMENT:  negative for rash, skin lesions, easy bruising   HEMATOLOGIC/LYMPHATIC:  negative for swelling/edema   ALLERGIC/IMMUNOLOGIC:  negative for urticaria , itching  ENDOCRINE:  negative increase in drinking, increase in urination, hot or cold intolerance  MUSCULOSKELETAL:  negative joint pains, muscle aches, swelling of joints  NEUROLOGICAL:  negative for headaches, dizziness, lightheadedness, numbness, pain, tingling extremities  BEHAVIOR/PSYCH:  negative for depression, anxiety    Physical Exam:     /79   Pulse 86   Temp 97.2 °F (36.2 °C) (Oral)   Resp 16   Ht 5' 2\" (1.575 m)   Wt 132 lb (59.9 kg)   LMP 2022   SpO2 97%   BMI 24.14 kg/m²   Temp (24hrs), Av.8 °F (37.1 °C), Min:95.9 °F (35.5 °C), Max:100.4 °F (38 °C)    No results for input(s): POCGLU in the last 72 hours. Intake/Output Summary (Last 24 hours) at 4/5/2022 1109  Last data filed at 4/4/2022 2130  Gross per 24 hour   Intake 1194 ml   Output --   Net 1194 ml       General Appearance:  alert, well appearing, and in no acute distress  Mental status: oriented to person, place, and time with normal affect  Head:  normocephalic, atraumatic. Eye: no icterus, redness, pupils equal and reactive, extraocular eye movements intact, conjunctiva clear  Ear: normal external ear, no discharge, hearing intact  Nose:  no drainage noted  Mouth: mucous membranes moist.  Very poor dentition  Neck: supple, no carotid bruits, thyroid not palpable  Lungs: Bilateral equal air entry, clear to ausculation, no wheezing, rales or rhonchi, normal effort  Cardiovascular: normal rate, regular rhythm, no murmur, gallop, rub. Abdomen: Soft, nontender, nondistended, normal bowel sounds, no hepatomegaly or splenomegaly  Neurologic: There are no new focal motor or sensory deficits, normal muscle tone and bulk, no abnormal sensation, normal speech, cranial nerves II through XII grossly intact  Skin: No gross lesions, rashes, bruising or bleeding on exposed skin area  Extremities:  peripheral pulses palpable, no pedal edema or calf pain with palpation. Right lower extremity is splinted and dressed. Digits are exposed and CMS is intact. Psych: normal affect     Investigations:      Laboratory Testing:  Recent Results (from the past 24 hour(s))   COVID-19, Rapid    Collection Time: 04/04/22  1:21 PM    Specimen: Nasopharyngeal Swab   Result Value Ref Range    Specimen Description . NASOPHARYNGEAL SWAB     SARS-CoV-2, Rapid Not Detected Not Detected   Hemoglobin and Hematocrit    Collection Time: 04/04/22  9:18 PM   Result Value Ref Range    Hemoglobin 10.9 (L) 11.9 - 15.1 g/dL    Hematocrit 37.0 36.3 - 47.1 %   Basic Metabolic Panel w/ Reflex to MG Collection Time: 04/05/22  6:41 AM   Result Value Ref Range    Glucose 132 (H) 70 - 99 mg/dL    BUN 7 6 - 20 mg/dL    CREATININE 0.61 0.50 - 0.90 mg/dL    Bun/Cre Ratio 11 9 - 20    Calcium 8.5 (L) 8.6 - 10.4 mg/dL    Sodium 137 135 - 144 mmol/L    Potassium 4.4 3.7 - 5.3 mmol/L    Chloride 103 98 - 107 mmol/L    CO2 23 20 - 31 mmol/L    Anion Gap 11 9 - 17 mmol/L    GFR Non-African American >60 >60 mL/min    GFR African American >60 >60 mL/min    GFR Comment         CBC with Auto Differential    Collection Time: 04/05/22  6:41 AM   Result Value Ref Range    WBC 12.2 (H) 3.5 - 11.3 k/uL    RBC 4.01 3.95 - 5.11 m/uL    Hemoglobin 10.7 (L) 11.9 - 15.1 g/dL    Hematocrit 35.6 (L) 36.3 - 47.1 %    MCV 88.8 82.6 - 102.9 fL    MCH 26.7 25.2 - 33.5 pg    MCHC 30.1 28.4 - 34.8 g/dL    RDW 14.9 (H) 11.8 - 14.4 %    Platelets 104 098 - 836 k/uL    MPV 9.9 8.1 - 13.5 fL    NRBC Automated 0.0 0.0 per 100 WBC    RBC Morphology ANISOCYTOSIS PRESENT     Seg Neutrophils 88 (H) 36 - 65 %    Lymphocytes 8 (L) 24 - 43 %    Monocytes 4 3 - 12 %    Eosinophils % 0 (L) 1 - 4 %    Basophils 0 0 - 2 %    Immature Granulocytes 0 0 %    Segs Absolute 10.74 (H) 1.50 - 8.10 k/uL    Absolute Lymph # 0.97 (L) 1.10 - 3.70 k/uL    Absolute Mono # 0.42 0.10 - 1.20 k/uL    Absolute Eos # <0.03 0.00 - 0.44 k/uL    Basophils Absolute <0.03 0.00 - 0.20 k/uL    Absolute Immature Granulocyte 0.03 0.00 - 0.30 k/uL       Imaging/Diagonstics:  XR TIBIA FIBULA RIGHT (2 VIEWS)    Result Date: 4/4/2022  Tibial fixation. XR TIBIA FIBULA RIGHT (2 VIEWS)    Result Date: 4/3/2022  Tibial fracture.      XR ANKLE RIGHT (MIN 3 VIEWS)    Result Date: 4/4/2022  Fixation of the tibia detailed above     XR ANKLE RIGHT (MIN 3 VIEWS)    Result Date: 4/3/2022  Fracture as above       Assessment :      Hospital Problems           Last Modified POA    * (Principal) Closed displaced spiral fracture of shaft of right tibia 4/4/2022 Yes    Chronic depression 4/5/2022 Yes Plan:     1. Closed displaced spiral fracture of right tibia  1. Postop care per orthopedics  2. Encourage engagement with PT/OT  3. De-escalate pain medications as pain becomes more tolerable  4. Recommend outpatient follow-up with PCP for bone scan and density measuring  2. Chronic depression  1. Continue home medication regimens and outpatient follow-up, reports currently stable  3. Elevated blood sugar reading  1.  Recommend outpatient follow-up with PCP for A1c testing as blood sugar readings are postoperatively and not fasting      Consultations:   IP CONSULT TO INTERNAL MEDICINE  IP CONSULT TO CASE MANAGEMENT      SWEETIE Medrano NP  4/5/2022  11:09 AM    Copy sent to Dr. Rashmi Reddy MD

## 2022-04-05 NOTE — ANESTHESIA POSTPROCEDURE EVALUATION
Department of Anesthesiology  Postprocedure Note    Patient: Yuan Chávez  MRN: [id number]  YOB: 1985  Date of evaluation: 4/4/2022  Time:  8:52 PM     Procedure Summary     Date: 04/04/22 Room / Location: [address] - INPATIENT    Anesthesia Start: 1243 Anesthesia Stop: 2052    Procedure: RIGHT TIBIA OPEN REDUCTION INTERNAL FIXATION WITH IM NAIL CLOSED REDUCTION PERCUTANEOUS FIXATION RIGHT MEDIAL MEALLOUS (Right Leg Lower) Diagnosis: (TIBIA, ANKLE FRACTURE RIGHT)    Surgeons: Waldo Quiñonez MD Responsible Provider: Ute Garcia MD    Anesthesia Type: general, regional ASA Status: 2          Anesthesia Type: general, regional    Chau Phase I:      Chau Phase II:      Last vitals: Reviewed and per EMR flowsheets.        Anesthesia Post Evaluation    Patient location during evaluation: PACU  Patient participation: complete - patient participated  Level of consciousness: awake  Airway patency: patent  Nausea & Vomiting: no nausea  Complications: no  Cardiovascular status: blood pressure returned to baseline  Respiratory status: acceptable  Hydration status: euvolemic  Comments: Multimodal analgesia pain management as indicated by procedure  Multimodal analgesia pain management approach

## 2022-04-05 NOTE — BRIEF OP NOTE
Brief Postoperative Note      Patient: Lisa Brunner  YOB: 1985  MRN: [id number]    Date of Procedure: 4/4/2022    Pre-Op Diagnosis: TIBIA, ANKLE FRACTURE RIGHT    Post-Op Diagnosis: Right midshaft tibia fracture; right medial malleolus fracture       Procedure(s):  RIGHT TIBIA OPEN REDUCTION INTERNAL FIXATION WITH IM NAIL CLOSED REDUCTION PERCUTANEOUS FIXATION RIGHT MEDIAL MEALLOUS    Surgeon(s):  Caio Garza MD    Assistant:  Resident: Alina Bragg DO    Anesthesia: General    Estimated Blood Loss (mL): 100 mL     Fluids: 1600 mL crystalloid     Tourniquet time: 125 minutes     Complications: laceration to saphenous vein, repaired via direct visualization. Specimens:   * No specimens in log *    Implants:  Implant Name Type Inv.  Item Serial No.  Lot No. LRB No. Used Action   NAIL IM L300MM DIA8MM UNIV DK GARRET PROX TIB TI BEND [employer] JAY - [id number]  NAIL IM L300MM DIA8MM UNIV DK GARRET PROX TIB TI BEND [employer] JAY  DEPUY SYNTHES [place]-WD [id number] Right 1 Implanted   SCREW BNE L34MM DIA4MM NONSTERILE TIB GARRET TI ST [employer] JAY - [id number]  SCREW BNE L34MM DIA4MM NONSTERILE TIB GARRET TI ST [employer] JAY  DEPUY SYNTHES USA-WD  Right 1 Implanted   SCREW BNE L36MM DIA4MM TIB GARRET TI ST [employer] JAY FULL THRD T25 - [id number]  SCREW BNE L36MM DIA4MM TIB GARRET TI ST [employer] JAY FULL THRD T25  DEPBettymovil USA-WD  Right 2 Implanted   SCREW BNE L30MM DIA4MM TIB GARRET TI ST [employer] JAY FULL THRD T25 - [id number]  SCREW BNE L30MM DIA4MM TIB GARRET TI ST [employer] JAY FULL THRD T25  DEPBettymovil USA-WD  Right 1 Implanted   SCREW BNE L32MM DIA4MM TIB GARRET TI ST [employer] JAY FULL THRD T25 - [id number]  SCREW BNE L32MM DIA4MM TIB GARRET TI ST [employer] JAY FULL THRD T25  DEPBettymovil USA-WD  Right 1 Implanted   SCREW REINA SHRT THRD SS 3.0X22MM - [id number] Screw/Plate/Nail/Gus SCREW REINA SHRT THRD SS 3.0X22MM  SYNTHES-PMM  Right 1 Implanted         Drains: * No LDAs found *    Findings: Right midshaft spiral oblique tibial shaft fracture; right minimally displaced medial malleolus fracture. See op note for details.      Electronically signed by Shauna Tesfaye DO on 4/4/2022 at 8:48 PM

## 2022-04-05 NOTE — PROGRESS NOTES
Orthopedic Progress Note    Patient:  Angele Aschoff  YOB: 1985     39 y.o. female    Subjective:  Patient seen and examined at bedside this morning. No new complaints or concerns per patient this morning. Pain well-controlled. No acute issues overnight per nursing. Did order nicotine patch for patient overnight. Denies: fever/chills, HA, CP, SOB, N/V, dysuria, or numbness/tingling in extremities. No BM/ is having flatus. Tolerated dinner last night. PT: Has not yet worked with PT but patient did use crutches to get out of bed. Objective:   Vitals:    04/05/22 0717   BP: 97/66   Pulse: 79   Resp: 16   Temp: 97.2 °F (36.2 °C)   SpO2: 97%     Gen: NAD, cooperative   Cardiovascular: Regular rate  Respiratory: no audible wheezing, symmetrical chest expansion   MSK: Right lower extremity: Ace bandage and splint on, which are c/d/i. Block still in effect and so dysesthesias diffusely to exposed digits of foot. Unable to move EHL/FHL secondary to block. DP pulse 2+. Toes warm and well perfused. Recent Labs     04/03/22  1700 04/04/22  2118 04/05/22  0641   WBC 5.8  --  12.2*   HGB 11.6*   < > 10.7*   HCT 38.2   < > 35.6*     --  376   INR 0.9  --   --      --  137   K 4.4  --  4.4   BUN 9  --  7   CREATININE 0.60  --  0.61   GLUCOSE 118*  --  132*    < > = values in this interval not displayed. Meds: Ancef   See rec for complete list    Impression: 39 y.o. female being seen and evaluated POD#1 from right tibia IMN and closed reduction percutaneous screw placement right medial malleolus fracture       Plan:     -Plan for evaluation with PT today   -If doing well, OK for discharge.  Wait for PT input as well as internal medicine team input regarding discharge   -Nonweightbearing to right lower extremity   -Post-op Hb 10.7  -Complete post op Ancef  -Pain control: Multimodal pain management protocol   -Tolerating PO intake well  -Voiding Well  -Ice (20 min, 1 hour off) and elevation for edema/pain control  -Encourage deep breathing and IS  -DVT ppx: EPC.  -PT/OT to evaluate and treat   -Please page Ortho on call with any questions or concerns    Silvino Bonner,   PGY-2 Orthopedic Surgery  7:30 AM 4/5/2022

## 2022-04-05 NOTE — PLAN OF CARE
Problem: Falls - Risk of:  Goal: Will remain free from falls  Description: Will remain free from falls  4/5/2022 1055 by Dejah Hughes, JOSE MIGUEL  Outcome: Ongoing  Note: Patient remains free from falls  Bed in lowest position, call light within reach, side rails x2, hourly rounding, fallen star, bed/chair alarm, patient instructed to call out for assist, non skid slippers  4/5/2022 0025 by Sharron Jackson RN  Outcome: Ongoing  Note: Patient will be free from falls this shift and is aware of personal limits.

## 2022-04-05 NOTE — OP NOTE
Operative Note      Patient: Lisa Brunner  YOB: 1985  MRN: [id number]     Date of Procedure: 4/4/2022     Pre-Op Diagnosis: TIBIA, ANKLE FRACTURE RIGHT     Post-Op Diagnosis: Right midshaft tibia fracture; right medial malleolus fracture       Procedure(s):  RIGHT TIBIA OPEN REDUCTION INTERNAL FIXATION WITH IM NAIL CLOSED REDUCTION PERCUTANEOUS FIXATION RIGHT MEDIAL MEALLOUS     Surgeon(s):  Caio Garza MD     Assistant:  Resident: Alina Bragg DO     Anesthesia: General     Estimated Blood Loss (mL): 100 mL      Fluids: 1600 mL crystalloid      Tourniquet time: 125 minutes      Complications: laceration to saphenous vein, repaired via direct visualization.      Specimens:   * No specimens in log *     Implants:  Implant Name Type Inv.  Item Serial No.  Lot No. LRB No. Used Action   NAIL IM L300MM DIA8MM UNIV DK GARRET PROX TIB TI BEND [employer] JAY - [id number]   NAIL IM L300MM DIA8MM UNIV DK GARRET PROX TIB TI BEND [employer] JAY   DEPU[employer] USA-WD [id number] Right 1 Implanted   SCREW BNE L34MM DIA4MM NONSTERILE TIB GARRET TI ST [employer] JAY - [id number]   SCREW BNE L34MM DIA4MM NONSTERILE TIB GARRET TI ST REINA JAY   DEPUY SYNTHES USA-WD   Right 1 Implanted   SCREW BNE L36MM DIA4MM TIB GARRET TI ST [employer] JAY FULL THRD T25 - [id number]   SCREW BNE L36MM DIA4MM TIB GARRET TI ST [employer] JAY FULL THRD T25   DEPDhf Taxi USA-WD   Right 2 Implanted   SCREW BNE L30MM DIA4MM TIB GARRET TI ST [employer] JAY FULL THRD T25 - [id number]   SCREW BNE L30MM DIA4MM TIB GARRET TI ST RE[employer] JAY FULL THRD T25   DEPDhf Taxi USA-WD   Right 1 Implanted   SCREW BNE L32MM DIA4MM TIB GARRET TI ST [employer] JAY FULL THRD T25 - [id number]   SCREW BNE L32MM DIA4MM TIB GARRET TI ST REINA JAY FULL THRD T25   DEPDhf Taxi USA-WD   Right 1 Implanted   SCREW REINA SHRT THRD SS 3.0X22MM - [id number] Screw/Plate/Nail/Gus SCREW REINA SHRT THRD SS 3.0X22MM   SYNTHES-PMM   Right 1 Implanted          Drains: * No LDAs found *     Findings: Right midshaft spiral oblique tibial shaft fracture; right minimally displaced medial malleolus fracture. See op note for details. Detailed Description of Procedure: On the day of surgery patient was met in the pre-operative unit where written consent was obtained and the operative site was marked with permanent marker. The patient was then wheeled back to the operative suite. General anesthesia was induced and the patient underwent endotracheal intubation. A well padded non-sterile tourniquet was applied to the right lower extremity and a well padded bump was placed under the ipsilateral hip. Appropriate antibiotics were being infused at this time. A timeout was held, and after all members were in agreement we proceeded forward with surgery. After standard sterile preparation and draping of the right lower extremity was complete we identified our surface landmarks and marked our planned incision approximately 4cm proximal to the superior pole of the patella. A midline 4cm incision was made with #10 blade and bovie electrocautery was used to achieve hemostasis. At this time the lateral and medial edges of the quadriceps tendon were identified and bovie was used to sharply incise the tendon longitudinally down to bone. At this time blunt dissection was used to achieve entrance into the knee joint. We then inserted the protection sleeve to avoid damaging the patellofemoral joint. This remained in place throughout the entirety of the case. The starting guide wire was drilled into the appropriate starting position utilizing c-arm fluoroscopy to confirm the starting point. Once the correct point was established the guide wire was advanced into bone. The opening reamer was then used over the guide wire to gain entrance into the tibial canal. At this time under the aid of the c-arm we reduced the fracture. Once anatomic reduction was achieved the ball-tipped guide wire was inserted and passed across the fracture site.  Again this was confirmed under c-arm. Once we felt we had anatomic reduction and had seated the guide wire into the distal tibia physeal scar we measured the approximately length of the nail that we were to use. We then began reaming the tibial canal with a 8.5 mm reamer. We gradually reamed larger sizes until appropriate resistance/chatter was encountered. We ultimately reamed to 10 mm, which was 2mm larger than our nail. Final chose nail size was 300 mm by 8 mm. The nail was assembled on the back table and was confirmed to be in appropriate position by passing a guide wire through the aiming arm interlocking screw holes. The nail was inserted and appropriate advancement was confirmed on c-arm. Once the nail was in it's final seated position the ball-tipped guide wire was removed. We confirmed anatomic reduction and began inserting our interlocking screws. We inserted 2 screws in medial position proximally and 3 screws distally. While placing the proximal-most screw via perfect circles, the saphenous vein was encountered. We proceeded to open the medial aspect of the distal tibia and identified the saphenous vein, noting a laceration along the vein. This was sutured using 6-0 proline suture. Laceration was noted to be well approximated and bleeding controlled. We completed placing distal screws and then turned our attention to the medial malleolus fracture. We placed two k-wires along the distal aspect of the tibia, one along the subchondral bone and then one placed parallel and proximal to the k-wire along the subchondral pin. A cannulated drill was used to drill through the near cortex of the proximal pin and a 22mm screw was placed. This was followed by placing a 34mm screw along the subchondral bone. At this time final images were obtained confirming a stable anatomic reduction. All wounds were thoroughly irrigated. Quadriceps tendon was closed with 0 vicryl. Subcutaneous tissue was closed with 2-0 vicryl.  Skin was closed staples. Sterile adaptic, 4x4s, webril and a posterior splint was placed to protect medial malleolus fracture. Anesthesia was reversed and the patient was extubated without complication. The patient was rolled back over to this hospital bed and wheeled to the recovery unit in stable condition. Dr. María Harris was present for and active in all critical aspects of the case.       Electronically signed by Stefany Baxter DO on 4/4/2022 at 10:33 PM

## 2022-04-05 NOTE — PLAN OF CARE
Problem: Falls - Risk of:  Goal: Will remain free from falls  Description: Will remain free from falls  Outcome: Ongoing  Note: Patient will be free from falls this shift and is aware of personal limits. Goal: Absence of physical injury  Description: Absence of physical injury  Outcome: Ongoing  Note: Patient is free from injury this shift. Problem: Pain:  Goal: Pain level will decrease  Description: Pain level will decrease  Outcome: Ongoing  Note: Patient has as needed pain control and will ask for it.

## 2022-04-05 NOTE — PROGRESS NOTES
Writer reviewed discharge instructions with patient.  She verbalized understanding, signature obtained, patient discharge home with belongings, hibiclens, scripts for Flexiril, Oxycodone, Tylenol ES, Colace and Neurontin

## 2022-04-05 NOTE — CARE COORDINATION
Discharge Planning:    POD #1 right tibia ORIF with Dr. Bandar Holt. Patient has crutches in room. Denies any other DME needs. Denies any HHC needs. Will f/u with Dr. Larios Servant office and obtain rx for OP therapy if necessary. Plan is for patient to discharge home today with no needs.

## 2022-04-05 NOTE — PROGRESS NOTES
Occupational Therapy   Occupational Therapy Initial Assessment  Date: 2022   Patient Name: Diane Zavala  MRN: [id number]     : 1985    RN reports patient is medically stable for therapy treatment this date. Chart reviewed prior to treatment and patient is agreeable for therapy. All lines intact and patient positioned comfortably at end of treatment. All patient needs addressed prior to ending therapy session. Date of Service: 2022    Discharge Recommendations:     OT Equipment Recommendations  Equipment Needed: Yes  Mobility Devices: ADL Assistive Devices  ADL Assistive Devices: Long-handled Sponge;Long-handled Shoe Horn    Assessment   Performance deficits / Impairments: Decreased functional mobility ; Decreased ADL status; Decreased endurance;Decreased high-level IADLs;Decreased balance  Assessment: Skilled OT services are indicated to increase I and safety during functional tasks to return home at [employer] as able. Prognosis: Good  Decision Making: Medium Complexity  OT Education: OT Role;Transfer Training;Energy Conservation;Plan of Care;ADL Adaptive Strategies;Precautions  Patient Education: safety in function, benefits of being oob, recommendation for continued therapy, fall prevention/call light use, crutch use, R NWB precuation  REQUIRES OT FOLLOW UP: Yes  Activity Tolerance  Activity Tolerance: Patient Tolerated treatment well  Activity Tolerance: good  Safety Devices  Safety Devices in place: Yes  Type of devices: Nurse notified;Gait belt;Bed alarm in place;Call light within reach; Patient at risk for falls; Left in bed           Patient Diagnosis(es): The encounter diagnosis was Post-operative pain. has a past medical history of Anxiety, Asthma, and Depression. has a past surgical history that includes LEEP and fracture surgery.      PER H&P: This is Diane Zavala a 39 y.o. female who presents today for a ankle open reduction internal fixation tibia, ankle by Dr. Yael Nazario for tibia ankle fracture right. Patient's chief complaint is right leg pain which occurred after falling out of her shower on 4/3/2022. Patient was evaluated in the emergency department and patient had x-rays showing displaced fracture of the distal tibia. Patient was initially going to be admitted but she signed out AMA to return today for surgery. Patient had long leg posterior splint placed in the emergency department. Patient denies numbness and tingling. Denies fever, chills, shortness of breath, cough, chest pain, open sores or wounds. Denies hx diabetes. Last Ibuprofen 4/3/2022. Restrictions  Restrictions/Precautions  Restrictions/Precautions: Weight Bearing  Required Braces or Orthoses?: No  Lower Extremity Weight Bearing Restrictions  Right Lower Extremity Weight Bearing: Non Weight Bearing  Position Activity Restriction  Other position/activity restrictions: RUE IV    Subjective   General  Chart Reviewed: Yes  Patient assessed for rehabilitation services?: Yes  Family / Caregiver Present: No  Patient Currently in Pain: Yes  Comments / Details: Pt reporting 3/10 pain R LE, states she was given pain medication and feels it is under control    Social/Functional History  Social/Functional History  Lives With: Daughter  Type of Home: Apartment  Home Layout: One level  Home Access: Ramped entrance  Bathroom Shower/Tub: Tub/Shower unit  Bathroom Toilet: Standard  Bathroom Equipment: Grab bars in shower  Home Equipment: Crutches  ADL Assistance: [address] [place] [address] Avenue: Independent  Homemaking Responsibilities: Yes  Ambulation Assistance: Independent  Transfer Assistance: Independent  Active : Yes  Occupation: Unemployed  Leisure & Hobbies: \"not really\"  Additional Comments: Pt reports one fall after slipping on a rub getting out of the shower which resulted in ankle fracture.        Objective   Vision: Within Functional Limits  Hearing: Within functional limits    Orientation  Overall Orientation Status: Within Functional Limits  Observation/Palpation  Posture: Good  Observation: cast/ace wrap on R LE  Edema: none       Balance  Sitting Balance: Stand by assistance  Standing Balance: Contact guard assistance (with crutches)  Standing Balance  Time: standing ~ 2 min  Activity: functional mobility  Functional Mobility  Functional - Mobility Device: Crutches  Activity:  (bed to window, window to fabrizio, door to bed)  Assist Level: Contact guard assistance  Functional Mobility Comments: Pt given Min verbal cues for slowing down, pacing self, safety with crutches, scanning environment all to increase safety. ADL  Feeding: Setup; Independent  Grooming: Stand by assistance;Setup  UE Bathing: Setup;Stand by assistance  LE Bathing: Setup;Stand by assistance  UE Dressing: Setup;Stand by assistance  LE Dressing: Setup;Stand by assistance  Toileting: Stand by assistance  Additional Comments: Pt educated on adaptive ADL tech including sitting vs standing, dressing surgical leg first, EC/WS tech. Pt verbalized good understanding of all education provided. Tone RUE  RUE Tone: Normotonic  Tone LUE  LUE Tone: Normotonic  Coordination  Movements Are Fluid And Coordinated: Yes        Bed mobility  Supine to Sit: Modified independent  Sit to Supine: Modified independent  Scooting: Modified independent  Comment: Pt given Min verbal cues for pacing self and and slowing down all to increase safety/ease. Transfers  Sit to stand: Contact guard assistance (with crutches)  Stand to sit: Contact guard assistance  Transfer Comments: Pt given Min verbal cues/tactile assist for pacing self, slowing down, proper use of crutches all to increase safety.      Cognition  Overall Cognitive Status: WFL        Sensation  Overall Sensation Status:  (Post op R toes)        LUE AROM (degrees)  LUE AROM : WFL  RUE AROM (degrees)  RUE AROM : WFL  LUE Strength  Gross LUE Strength: WFL  LUE Strength Comment: ~ 4/5  RUE Strength  Gross RUE Strength: WFL  RUE Strength Comment: ~ 4/5                   Plan   Plan  Times per week: 4-5x/wk 1x/day as libia  Current Treatment Recommendations: Strengthening,Endurance Training,Balance Training,Functional Mobility Training,Home Management Training,Self-Care / ADL,Safety Education & Training,Equipment Evaluation, Education, & procurement                          AM-PAC Score: 19              Goals  Short term goals  Time Frame for Short term goals: By discharge, pt to demo  Short term goal 1: ADL transfers and functional mobility to Mod I with use of AD as needed. Short term goal 2: increased B UE strength by 1/2 grade to assist with functional tasks/I with B UE HEP with use of handouts as needed. Short term goal 3: UB/LB ADLs to Mod I with use of AD/AE as needed. Short term goal 4: I with fall prevention education, EC/WS tech, recommendations for AE with use of handouts as needed. Short term goal 5: toileting to Mod I with of AD/grab bars as needed. Patient Goals   Patient goals : To go home today!        Therapy Time   Individual Concurrent Group Co-treatment   Time In 0804 (Plus 10 min for chart review/RN communication)         Time Out 0821         Minutes 17+ 10 = 27          tx time: 15 min        Abelardo Burroughs OT

## 2022-04-06 ENCOUNTER — CARE COORDINATION (OUTPATIENT)
Dept: CASE MANAGEMENT | Age: 37
End: 2022-04-06

## 2022-04-06 NOTE — CARE COORDINATION
Seun 45 Transitions Initial Follow Up Call- Harry Jama MD    Call within 2 business days of discharge: Yes    Patient: Jessica Pradhan Patient : 1985   MRN: [id number]  Reason for Admission: closed distal right tibia fracture, right tibia ORIF closed reduction   Discharge Date: 22 RARS: Readmission Risk Score: 8.8 ( )      Last Discharge [facility]       Complaint Diagnosis Description Type Department Provider    22  Post-operative pain Admission (Discharged) Chela Randhawa MD           Spoke Aleksey Griffin    Date/Time:  2022 1:41 PM  Attempted to reach patient by telephone. Call within 2 business days of discharge: Yes Left HIPPA compliant message requesting a return call. Will attempt to reach patient again. Patient returned call stating she is doing pretty good. She just took 2 of the pain meds (juan) and took a flexeril. Writer educated on use of acetaminophen (Tylenol, naproxen (Aleve) or flexeril. She took a flexeril. Writer started to explain using the OTC meds to help in between pain pills to be able to stretch them out a bit (only ordered 30). Call disconnected. Writer attempted call back- no answer  Will attempt tomorrow         Care Transitions 24 Hour Call    Care Transitions Interventions         Follow Up  No future appointments.     Tena Garcia RN

## 2022-04-07 ENCOUNTER — CARE COORDINATION (OUTPATIENT)
Dept: CASE MANAGEMENT | Age: 37
End: 2022-04-07

## 2022-04-07 NOTE — CARE COORDINATION
Seun 45 Transitions Initial Follow Up Call- 2nd attempt    Call within 2 business days of discharge: Yes    Patient: Mary Kate Pelletier Patient : 1985   MRN: [id number]  Reason for Admission: closed distal right tibia fracture, right tibia ORIF closed reduction    Discharge Date: 22 RARS: Readmission Risk Score: 8.8 ( )      Last Discharge [facility]       Complaint Diagnosis Description Type Department Provider    22  Post-operative pain Admission (Discharged) MD Seun Arevalo 45 Transitions Initial Follow Up Call    Call within 2 business days of discharge: Yes    Patient: Mary Kate Pelletier Patient : 1985   MRN: [id number]  Reason for Admission: closed distal right tibia fracture, right tibia ORIF closed reduction    Discharge Date: 22 RARS: Readmission Risk Score: 8.8 ( )      Last Discharge [facility]       Complaint Diagnosis Description Type Department Provider    22  Post-operative pain Admission (Discharged) MD Mickey           Date/Time:  2022 3:16 PM  Attempted to reach patient by telephone. Call within 2 business days of discharge: Yes. 2nd attempt. VM full- unable to leave message. Episode complete. Follow Up  No future appointments. Laura Houston RN      Follow Up  No future appointments.     Laura Houston RN

## 2022-04-12 ENCOUNTER — OFFICE VISIT (OUTPATIENT)
Dept: ORTHOPEDIC SURGERY | Age: 37
End: 2022-04-12

## 2022-04-12 VITALS — WEIGHT: 132 LBS | BODY MASS INDEX: 24.29 KG/M2 | HEIGHT: 62 IN

## 2022-04-12 DIAGNOSIS — S82.241D CLOSED DISPLACED SPIRAL FRACTURE OF SHAFT OF RIGHT TIBIA WITH ROUTINE HEALING, SUBSEQUENT ENCOUNTER: Primary | ICD-10-CM

## 2022-04-12 PROCEDURE — 99024 POSTOP FOLLOW-UP VISIT: CPT | Performed by: ORTHOPAEDIC SURGERY

## 2022-04-12 RX ORDER — HYDROCODONE BITARTRATE AND ACETAMINOPHEN 5; 325 MG/1; MG/1
1 TABLET ORAL EVERY 6 HOURS PRN
Qty: 28 TABLET | Refills: 0 | Status: SHIPPED | OUTPATIENT
Start: 2022-04-12 | End: 2022-04-19

## 2022-04-12 NOTE — PROGRESS NOTES
Chief Complaint   Patient presents with    Post-Op Check     04/04/2022- RT ANKLE INJURY - ORIF    Patient were undergone open reduction internal fixation of midshaft fracture tibia and ankle on 4/4/2022 is seen here in follow-up. The patient had called up on Saturday about the dressing instruction and I told her that she could take off the dressing and if everything looks fine she could wash it with soap and water the suprapatellar incision and also put a Band-Aid on it. She did mention that she had some clicking in the knee But she has had this before even the injury of the surgery. Examination: The splint and all the dressings were removed and all the incisions are healing satisfactorily. She is able to dorsiflex the ankle about 5 to 10 degrees. There was no calf tenderness in the calf and the calf muscles are very soft. Her Homans test is negative. Diagnosis: Status post ORIF fracture midshaft right tibia and medial malleolus. Treatment: I got her to start putting some weight on the right leg. She was able to do it. She did not help too much pain. We will start her on physical therapy to help encourage her to start exercises for the ankle and knee and will see her again in a week's time to have the staples removed.

## 2022-04-21 DIAGNOSIS — S82.241D CLOSED DISPLACED SPIRAL FRACTURE OF SHAFT OF RIGHT TIBIA WITH ROUTINE HEALING, SUBSEQUENT ENCOUNTER: Primary | ICD-10-CM

## 2022-04-21 NOTE — TELEPHONE ENCOUNTER
Patient requesting a refill of gabapentin . She states for pain she has been using a lidocaine cream, is there anyway you can also send a lidocaine cream into her preferred pharmacy . I REMOVED STAPLES FROM RIGHT LEG TODAY, INCISION IS HEALING WELL - NO SIGNS OF INFECTION.

## 2022-04-22 RX ORDER — GABAPENTIN 100 MG/1
100 CAPSULE ORAL 3 TIMES DAILY
Qty: 42 CAPSULE | Refills: 0 | Status: SHIPPED | OUTPATIENT
Start: 2022-04-22 | End: 2022-05-06

## 2022-04-27 ENCOUNTER — TELEPHONE (OUTPATIENT)
Dept: ORTHOPEDIC SURGERY | Age: 37
End: 2022-04-27

## 2022-05-12 ENCOUNTER — TELEPHONE (OUTPATIENT)
Dept: ORTHOPEDIC SURGERY | Age: 37
End: 2022-05-12

## 2022-06-22 DIAGNOSIS — S82.241D CLOSED DISPLACED SPIRAL FRACTURE OF SHAFT OF RIGHT TIBIA WITH ROUTINE HEALING, SUBSEQUENT ENCOUNTER: ICD-10-CM

## 2022-06-22 RX ORDER — GABAPENTIN 100 MG/1
CAPSULE ORAL
Qty: 42 CAPSULE | OUTPATIENT
Start: 2022-06-22

## 2022-07-08 DIAGNOSIS — S82.241D CLOSED DISPLACED SPIRAL FRACTURE OF SHAFT OF RIGHT TIBIA WITH ROUTINE HEALING, SUBSEQUENT ENCOUNTER: ICD-10-CM

## 2022-07-08 RX ORDER — GABAPENTIN 100 MG/1
100 CAPSULE ORAL 3 TIMES DAILY
Qty: 42 CAPSULE | Refills: 0 | OUTPATIENT
Start: 2022-07-08 | End: 2022-07-22

## 2022-07-08 NOTE — TELEPHONE ENCOUNTER
Patient is asking for a refill as she is completely out.      [address] in [place], [address]    Next appt 07/19

## 2022-07-18 DIAGNOSIS — S82.241D CLOSED DISPLACED SPIRAL FRACTURE OF SHAFT OF RIGHT TIBIA WITH ROUTINE HEALING, SUBSEQUENT ENCOUNTER: Primary | ICD-10-CM

## (undated) DEVICE — SPLINT ORTH W5XL30IN PLSTR OF PARIS LO EXOTHERM SMOOTH

## (undated) DEVICE — INTENDED FOR TISSUE SEPARATION, AND OTHER PROCEDURES THAT REQUIRE A SHARP SURGICAL BLADE TO PUNCTURE OR CUT.: Brand: BARD-PARKER ® CARBON RIB-BACK BLADES

## (undated) DEVICE — DRESSING TRNSPAR W5XL4.5IN FLM SHT SEMIPERMEABLE WIND

## (undated) DEVICE — GUIDEWIRE ORTH L150MM DIA1.1MM S STL THRD FOR 3MM CANN SCR

## (undated) DEVICE — Device

## (undated) DEVICE — SUTURE NONABSORBABLE MONOFILAMENT 3-0 PS-1 18 IN BLK ETHILON 1663H

## (undated) DEVICE — SUTURE VCRL SZ 0 L36IN ABSRB UD L36MM CT-1 1/2 CIR J946H

## (undated) DEVICE — PADDING CAST W6INXL4YD COT LO LINTING WYTEX

## (undated) DEVICE — SUTURE VCRL SZ 2-0 L27IN ABSRB UD L26MM CT-2 1/2 CIR J269H

## (undated) DEVICE — BIT DRL L150MM DIA2MM CANN QUIK CPL W/O STP REUSE FOR 3MM

## (undated) DEVICE — SCREW BNE L28MM DIA4MM TIB BLU TI ST CANN LOK FULL THRD T25
Type: IMPLANTABLE DEVICE | Site: TIBIA | Status: NON-FUNCTIONAL
Removed: 2022-04-04

## (undated) DEVICE — BANDAGE COBAN 4 IN COMPR W4INXL5YD FOAM COHESIVE QUIK STK SELF ADH SFT

## (undated) DEVICE — SPLINT QUICK STEP SCOTCHCAST 5 X 30

## (undated) DEVICE — PADDING CAST W6INXL4YD POLY POR SPUN DACRON NON STERILE

## (undated) DEVICE — GLOVE SURG SZ 75 CRM LTX FREE POLYISOPRENE POLYMER BEAD ANTI

## (undated) DEVICE — NEPTUNE E-SEP SMOKE EVACUATION PENCIL, COATED, 70MM BLADE, PUSH BUTTON SWITCH: Brand: NEPTUNE E-SEP

## (undated) DEVICE — GLOVE SURG SZ 7 CRM LTX FREE POLYISOPRENE POLYMER BEAD ANTI

## (undated) DEVICE — ROD RMR L950MM DIA2.5MM W/ EXTN BALL TIP

## (undated) DEVICE — TUBING, SUCTION, 1/4" X 12', STRAIGHT: Brand: MEDLINE

## (undated) DEVICE — GUIDEWIRE ORTH L400MM DIA3.2MM FOR TFN

## (undated) DEVICE — SKIN PREP TRAY W/CHG: Brand: MEDLINE INDUSTRIES, INC.

## (undated) DEVICE — BIT DRL L145MM DIA3.2MM 3 FLUT QUIK CPL FOR EXPERT TIB

## (undated) DEVICE — BNDG,ELSTC,MATRIX,STRL,6"X5YD,LF,HOOK&LP: Brand: MEDLINE

## (undated) DEVICE — TOWEL,OR,DSP,ST,BLUE,DLX,XR,4/PK,20PK/CS: Brand: MEDLINE

## (undated) DEVICE — C-ARM: Brand: UNBRANDED

## (undated) DEVICE — 3M™ STERI-STRIP™ COMPOUND BENZOIN TINCTURE 40 BAGS/CARTON 4 CARTONS/CASE C1544: Brand: 3M™ STERI-STRIP™

## (undated) DEVICE — PAD,ABDOMINAL,5"X9",ST,LF,25/BX: Brand: MEDLINE INDUSTRIES, INC.

## (undated) DEVICE — BNDG,ELSTC,MATRIX,STRL,4"X5YD,LF,HOOK&LP: Brand: MEDLINE

## (undated) DEVICE — BLANKET WRM W29.9XL79.1IN UP BODY FORC AIR MISTRAL-AIR